# Patient Record
Sex: FEMALE | Race: WHITE | NOT HISPANIC OR LATINO
[De-identification: names, ages, dates, MRNs, and addresses within clinical notes are randomized per-mention and may not be internally consistent; named-entity substitution may affect disease eponyms.]

---

## 2017-11-17 ENCOUNTER — TRANSCRIPTION ENCOUNTER (OUTPATIENT)
Age: 42
End: 2017-11-17

## 2018-01-03 ENCOUNTER — TRANSCRIPTION ENCOUNTER (OUTPATIENT)
Age: 43
End: 2018-01-03

## 2019-01-24 ENCOUNTER — APPOINTMENT (OUTPATIENT)
Dept: FAMILY MEDICINE | Facility: CLINIC | Age: 44
End: 2019-01-24
Payer: COMMERCIAL

## 2019-01-24 VITALS — TEMPERATURE: 98.2 F | SYSTOLIC BLOOD PRESSURE: 90 MMHG | DIASTOLIC BLOOD PRESSURE: 60 MMHG

## 2019-01-24 PROCEDURE — 99214 OFFICE O/P EST MOD 30 MIN: CPT

## 2019-01-24 PROCEDURE — 87804 INFLUENZA ASSAY W/OPTIC: CPT | Mod: QW

## 2019-01-24 NOTE — PHYSICAL EXAM
[No Acute Distress] : no acute distress [Supple] : supple [Clear to Auscultation] : lungs were clear to auscultation bilaterally [Regular Rhythm] : with a regular rhythm [Normal Anterior Cervical Nodes] : no anterior cervical lymphadenopathy [Alert and Oriented x3] : oriented to person, place, and time [de-identified] : mildy injected throat with mucus

## 2019-01-24 NOTE — REVIEW OF SYSTEMS
[Fever] : fever [Chills] : chills [Fatigue] : fatigue [Nasal Discharge] : nasal discharge [Postnasal Drip] : postnasal drip [Negative] : Respiratory

## 2019-01-24 NOTE — ASSESSMENT
[FreeTextEntry1] : Pt presents to office for sick visit .Pt's daughter dx last week with flu.pt admits to 3 ay hx of low grade temp, body aches and sinus congestion. Clear nasal discharge but admits to HA and dental pain.\par \par Rapid Flu A +\par Pt to start Xofluza

## 2019-01-24 NOTE — HISTORY OF PRESENT ILLNESS
[FreeTextEntry8] : Pt presents to office for sick visit .Pt's daughter dx last week with flu.pt admits to 3 ay hx of low grade temp, body aches and sinus congestion. Clear nasal discharge but admits to HA and dental pain.

## 2019-02-01 ENCOUNTER — APPOINTMENT (OUTPATIENT)
Dept: SURGERY | Facility: CLINIC | Age: 44
End: 2019-02-01
Payer: COMMERCIAL

## 2019-02-01 VITALS
BODY MASS INDEX: 18.14 KG/M2 | HEIGHT: 59 IN | HEART RATE: 76 BPM | DIASTOLIC BLOOD PRESSURE: 66 MMHG | SYSTOLIC BLOOD PRESSURE: 104 MMHG | TEMPERATURE: 98.4 F | WEIGHT: 90 LBS | OXYGEN SATURATION: 98 % | RESPIRATION RATE: 15 BRPM

## 2019-02-01 DIAGNOSIS — Z87.09 PERSONAL HISTORY OF OTHER DISEASES OF THE RESPIRATORY SYSTEM: ICD-10-CM

## 2019-02-01 DIAGNOSIS — R68.89 OTHER GENERAL SYMPTOMS AND SIGNS: ICD-10-CM

## 2019-02-01 DIAGNOSIS — Z87.39 PERSONAL HISTORY OF OTHER DISEASES OF THE MUSCULOSKELETAL SYSTEM AND CONNECTIVE TISSUE: ICD-10-CM

## 2019-02-01 DIAGNOSIS — K42.9 UMBILICAL HERNIA W/OUT OBSTRUCTION OR GANGRENE: ICD-10-CM

## 2019-02-01 DIAGNOSIS — J10.1 INFLUENZA DUE TO OTHER IDENTIFIED INFLUENZA VIRUS WITH OTHER RESPIRATORY MANIFESTATIONS: ICD-10-CM

## 2019-02-01 DIAGNOSIS — Z22.322 CARRIER OR SUSPECTED CARRIER OF METHICILLIN RESISTANT STAPHYLOCOCCUS AUREUS: ICD-10-CM

## 2019-02-01 DIAGNOSIS — Z86.69 PERSONAL HISTORY OF OTHER DISEASES OF THE NERVOUS SYSTEM AND SENSE ORGANS: ICD-10-CM

## 2019-02-01 DIAGNOSIS — Z78.9 OTHER SPECIFIED HEALTH STATUS: ICD-10-CM

## 2019-02-01 PROCEDURE — 99204 OFFICE O/P NEW MOD 45 MIN: CPT

## 2019-02-01 RX ORDER — BALOXAVIR MARBOXIL 20 MG/1
2 X 20 TABLET, FILM COATED ORAL
Qty: 2 | Refills: 0 | Status: DISCONTINUED | COMMUNITY
Start: 2019-01-24 | End: 2019-02-01

## 2019-02-01 NOTE — ASSESSMENT
[FreeTextEntry1] : Quite thin 43-year-old female with mild diastasis recti, tiny umbilical hernia and umbilical protrusion due to thin surrounding fat layer.

## 2019-02-01 NOTE — HISTORY OF PRESENT ILLNESS
[de-identified] : Maira is a 42 y/o female here for a follow up visit for an umbilical hernia. She was last seen on 6/5/13.  [de-identified] : Approximately 6 years ago patient was first noted to have a small umbilical hernia and mild degree of midline diastasis. Hernia repair was advised but patient chose to defer. Since then she has noted a somewhat greater degree of protrusion of the umbilicus. No pain or tenderness and no other abdominal symptoms or change in bowel habits.

## 2019-02-01 NOTE — PLAN
[FreeTextEntry1] : Patient advised to seek consultation with plastic surgery regarding possible umbilical plasty to reduce the bulk of the umbilicus and decrease the degree of protrusion.

## 2019-02-01 NOTE — PHYSICAL EXAM
[Normal Thyroid] : the thyroid was normal [Respiratory Effort] : normal respiratory effort [Normal Rate and Rhythm] : normal rate and rhythm [Abdominal Aorta] : Normal abdominal aorta [No Rash or Lesion] : No rash or lesion [Oriented to Person] : oriented to person [Oriented to Place] : oriented to place [Oriented to Time] : oriented to time [Calm] : calm [de-identified] : In no distress [de-identified] : Normocephalic, atraumatic [de-identified] : No palpable adenopathy [de-identified] : Soft, nondistended, nontender. No palpable mass or organomegaly. Mild degree of midline diastasis centered at the umbilicus. Very thin layer of subcutaneous fat over the entire abdominal wall, especially over the area of diastasis. Moderate degree of protrusion of the umbilicus most of which represents umbilical skin lying directly on underlying fascia. Tiny fascial defect with protrusion of minimal amount of fat. Well-healed Pfannenstiel scar. [de-identified] : Normal gait; no deformities [de-identified] : No gross sensory or motor deficit [de-identified] : Normal mood and affect

## 2019-02-04 LAB
MRSA SPEC QL CULT: NOT DETECTED
STAPH AUREUS (SA): NOT DETECTED

## 2019-02-11 LAB
FLUAV SPEC QL CULT: POSITIVE
FLUBV AG SPEC QL IA: NEGATIVE

## 2019-04-08 ENCOUNTER — APPOINTMENT (OUTPATIENT)
Dept: SURGERY | Facility: CLINIC | Age: 44
End: 2019-04-08
Payer: COMMERCIAL

## 2019-04-08 VITALS
HEART RATE: 58 BPM | HEIGHT: 59 IN | BODY MASS INDEX: 18.35 KG/M2 | WEIGHT: 91 LBS | SYSTOLIC BLOOD PRESSURE: 100 MMHG | DIASTOLIC BLOOD PRESSURE: 65 MMHG | TEMPERATURE: 98 F

## 2019-04-08 PROCEDURE — 99243 OFF/OP CNSLTJ NEW/EST LOW 30: CPT

## 2019-06-20 ENCOUNTER — OUTPATIENT (OUTPATIENT)
Dept: OUTPATIENT SERVICES | Facility: HOSPITAL | Age: 44
LOS: 1 days | End: 2019-06-20

## 2019-06-20 VITALS
WEIGHT: 85.98 LBS | DIASTOLIC BLOOD PRESSURE: 60 MMHG | HEART RATE: 60 BPM | RESPIRATION RATE: 16 BRPM | HEIGHT: 57 IN | TEMPERATURE: 98 F | SYSTOLIC BLOOD PRESSURE: 98 MMHG

## 2019-06-20 DIAGNOSIS — Z41.1 ENCOUNTER FOR COSMETIC SURGERY: ICD-10-CM

## 2019-06-20 RX ORDER — SODIUM CHLORIDE 9 MG/ML
3 INJECTION INTRAMUSCULAR; INTRAVENOUS; SUBCUTANEOUS EVERY 8 HOURS
Refills: 0 | Status: DISCONTINUED | OUTPATIENT
Start: 2019-07-02 | End: 2019-07-03

## 2019-06-20 NOTE — H&P PST ADULT - NEGATIVE GASTROINTESTINAL SYMPTOMS
no change in bowel habits/no melena/no jaundice/no nausea/no vomiting/no constipation/no abdominal pain/no steatorrhea/no hiccoughs/no diarrhea

## 2019-06-20 NOTE — H&P PST ADULT - NEGATIVE CARDIOVASCULAR SYMPTOMS
no dyspnea on exertion/no chest pain/no claudication/no paroxysmal nocturnal dyspnea/no palpitations/no orthopnea/no peripheral edema

## 2019-06-20 NOTE — H&P PST ADULT - NEGATIVE ENMT SYMPTOMS
no dysphagia/no hearing difficulty/no tinnitus/no nasal discharge/no throat pain/no vertigo/no sinus symptoms/no nasal obstruction/no post-nasal discharge/no nose bleeds/no recurrent cold sores/no abnormal taste sensation/no ear pain

## 2019-06-20 NOTE — H&P PST ADULT - NEGATIVE BREAST SYMPTOMS
no breast lump L/no nipple discharge L/no nipple discharge R/no breast tenderness L/no breast tenderness R/no breast lump R

## 2019-06-20 NOTE — H&P PST ADULT - RS GEN PE MLT RESP DETAILS PC
airway patent/good air movement/breath sounds equal/clear to auscultation bilaterally/no rhonchi/no chest wall tenderness/no rales/no wheezes/respirations non-labored/no intercostal retractions

## 2019-06-20 NOTE — H&P PST ADULT - NSICDXPROBLEM_GEN_ALL_CORE_FT
PROBLEM DIAGNOSES  Problem: Encounter for cosmetic surgery  Assessment and Plan: Scheduled for abdominoplasty on 07/02/19. Pre op instructions, famotidine, chlorhexidine gluconate soap given and explained. Pt verbalized understanding. Written instructions given to pt with teach back demonstration  Pt instructed to bring urine specimen on day of surgery, container given to pt who verbalized understanding.

## 2019-06-20 NOTE — H&P PST ADULT - NEGATIVE SKIN SYMPTOMS
no dryness/no itching/no rash/no change in size/color of mole/no brittle nails/no hair loss/no tumor

## 2019-06-20 NOTE — H&P PST ADULT - HISTORY OF PRESENT ILLNESS
43 y/o  female   h/o abd. hernia since 2011 43 y/o  female presents to [facility] for pre op evaluation with h/o abd. hernia s/p  section in . Now scheduled for abdominoplasty on 19

## 2019-06-20 NOTE — H&P PST ADULT - NSANTHOSAYNRD_GEN_A_CORE
No. ERA screening performed.  STOP BANG Legend: 0-2 = LOW Risk; 3-4 = INTERMEDIATE Risk; 5-8 = HIGH Risk

## 2019-07-01 ENCOUNTER — TRANSCRIPTION ENCOUNTER (OUTPATIENT)
Age: 44
End: 2019-07-01

## 2019-07-02 ENCOUNTER — TRANSCRIPTION ENCOUNTER (OUTPATIENT)
Age: 44
End: 2019-07-02

## 2019-07-02 ENCOUNTER — INPATIENT (INPATIENT)
Facility: HOSPITAL | Age: 44
LOS: 0 days | Discharge: HOME | End: 2019-07-03
Attending: SURGERY | Admitting: SURGERY
Payer: COMMERCIAL

## 2019-07-02 ENCOUNTER — APPOINTMENT (OUTPATIENT)
Dept: SURGERY | Facility: HOSPITAL | Age: 44
End: 2019-07-02

## 2019-07-02 VITALS
SYSTOLIC BLOOD PRESSURE: 102 MMHG | DIASTOLIC BLOOD PRESSURE: 53 MMHG | HEART RATE: 63 BPM | RESPIRATION RATE: 15 BRPM | HEIGHT: 57 IN | WEIGHT: 85.98 LBS | TEMPERATURE: 98 F | OXYGEN SATURATION: 99 %

## 2019-07-02 DIAGNOSIS — K43.9 VENTRAL HERNIA WITHOUT OBSTRUCTION OR GANGRENE: ICD-10-CM

## 2019-07-02 DIAGNOSIS — Z41.1 ENCOUNTER FOR COSMETIC SURGERY: ICD-10-CM

## 2019-07-02 LAB — HCG UR QL: NEGATIVE — SIGNIFICANT CHANGE UP

## 2019-07-02 PROCEDURE — 15830 EXC EXCESSIVE SKIN ABDOMEN: CPT

## 2019-07-02 PROCEDURE — 49560: CPT

## 2019-07-02 RX ORDER — SENNA PLUS 8.6 MG/1
2 TABLET ORAL AT BEDTIME
Refills: 0 | Status: DISCONTINUED | OUTPATIENT
Start: 2019-07-02 | End: 2019-07-03

## 2019-07-02 RX ORDER — HYDROMORPHONE HYDROCHLORIDE 2 MG/ML
0.5 INJECTION INTRAMUSCULAR; INTRAVENOUS; SUBCUTANEOUS
Refills: 0 | Status: DISCONTINUED | OUTPATIENT
Start: 2019-07-02 | End: 2019-07-03

## 2019-07-02 RX ORDER — BENZOCAINE AND MENTHOL 5; 1 G/100ML; G/100ML
1 LIQUID ORAL
Refills: 0 | Status: DISCONTINUED | OUTPATIENT
Start: 2019-07-02 | End: 2019-07-03

## 2019-07-02 RX ORDER — DIAZEPAM 5 MG
5 TABLET ORAL EVERY 6 HOURS
Refills: 0 | Status: DISCONTINUED | OUTPATIENT
Start: 2019-07-02 | End: 2019-07-03

## 2019-07-02 RX ORDER — OXYCODONE HYDROCHLORIDE 5 MG/1
10 TABLET ORAL EVERY 4 HOURS
Refills: 0 | Status: DISCONTINUED | OUTPATIENT
Start: 2019-07-02 | End: 2019-07-03

## 2019-07-02 RX ORDER — ACETAMINOPHEN 500 MG
650 TABLET ORAL EVERY 6 HOURS
Refills: 0 | Status: DISCONTINUED | OUTPATIENT
Start: 2019-07-02 | End: 2019-07-03

## 2019-07-02 RX ORDER — KETOTIFEN FUMARATE 0.34 MG/ML
1 SOLUTION OPHTHALMIC DAILY
Refills: 0 | Status: DISCONTINUED | OUTPATIENT
Start: 2019-07-02 | End: 2019-07-03

## 2019-07-02 RX ORDER — CEFAZOLIN SODIUM 1 G
1000 VIAL (EA) INJECTION EVERY 8 HOURS
Refills: 0 | Status: DISCONTINUED | OUTPATIENT
Start: 2019-07-02 | End: 2019-07-03

## 2019-07-02 RX ORDER — METOCLOPRAMIDE HCL 10 MG
10 TABLET ORAL EVERY 6 HOURS
Refills: 0 | Status: DISCONTINUED | OUTPATIENT
Start: 2019-07-02 | End: 2019-07-03

## 2019-07-02 RX ORDER — SODIUM CHLORIDE 9 MG/ML
1000 INJECTION, SOLUTION INTRAVENOUS
Refills: 0 | Status: DISCONTINUED | OUTPATIENT
Start: 2019-07-02 | End: 2019-07-02

## 2019-07-02 RX ORDER — SODIUM CHLORIDE 9 MG/ML
1000 INJECTION, SOLUTION INTRAVENOUS
Refills: 0 | Status: DISCONTINUED | OUTPATIENT
Start: 2019-07-02 | End: 2019-07-03

## 2019-07-02 RX ORDER — DOCUSATE SODIUM 100 MG
100 CAPSULE ORAL DAILY
Refills: 0 | Status: DISCONTINUED | OUTPATIENT
Start: 2019-07-02 | End: 2019-07-03

## 2019-07-02 RX ORDER — OXYCODONE HYDROCHLORIDE 5 MG/1
5 TABLET ORAL EVERY 4 HOURS
Refills: 0 | Status: DISCONTINUED | OUTPATIENT
Start: 2019-07-02 | End: 2019-07-03

## 2019-07-02 RX ORDER — ONDANSETRON 8 MG/1
4 TABLET, FILM COATED ORAL EVERY 6 HOURS
Refills: 0 | Status: DISCONTINUED | OUTPATIENT
Start: 2019-07-02 | End: 2019-07-03

## 2019-07-02 RX ORDER — DIPHENHYDRAMINE HCL 50 MG
25 CAPSULE ORAL EVERY 6 HOURS
Refills: 0 | Status: DISCONTINUED | OUTPATIENT
Start: 2019-07-02 | End: 2019-07-03

## 2019-07-02 RX ADMIN — Medication 1 DROP(S): at 20:55

## 2019-07-02 RX ADMIN — Medication 100 MILLIGRAM(S): at 14:41

## 2019-07-02 RX ADMIN — SENNA PLUS 2 TABLET(S): 8.6 TABLET ORAL at 21:27

## 2019-07-02 RX ADMIN — SODIUM CHLORIDE 3 MILLILITER(S): 9 INJECTION INTRAMUSCULAR; INTRAVENOUS; SUBCUTANEOUS at 21:28

## 2019-07-02 RX ADMIN — Medication 100 MILLIGRAM(S): at 15:27

## 2019-07-02 RX ADMIN — HYDROMORPHONE HYDROCHLORIDE 0.5 MILLIGRAM(S): 2 INJECTION INTRAMUSCULAR; INTRAVENOUS; SUBCUTANEOUS at 12:13

## 2019-07-02 RX ADMIN — SODIUM CHLORIDE 3 MILLILITER(S): 9 INJECTION INTRAMUSCULAR; INTRAVENOUS; SUBCUTANEOUS at 18:01

## 2019-07-02 RX ADMIN — ONDANSETRON 4 MILLIGRAM(S): 8 TABLET, FILM COATED ORAL at 16:25

## 2019-07-02 RX ADMIN — Medication 650 MILLIGRAM(S): at 20:57

## 2019-07-02 RX ADMIN — HYDROMORPHONE HYDROCHLORIDE 0.5 MILLIGRAM(S): 2 INJECTION INTRAMUSCULAR; INTRAVENOUS; SUBCUTANEOUS at 12:30

## 2019-07-02 RX ADMIN — SODIUM CHLORIDE 50 MILLILITER(S): 9 INJECTION, SOLUTION INTRAVENOUS at 12:14

## 2019-07-02 RX ADMIN — Medication 5 MILLIGRAM(S): at 20:55

## 2019-07-02 RX ADMIN — Medication 650 MILLIGRAM(S): at 14:40

## 2019-07-02 RX ADMIN — Medication 650 MILLIGRAM(S): at 21:27

## 2019-07-02 NOTE — DISCHARGE NOTE PROVIDER - NSDCCPCAREPLAN_GEN_ALL_CORE_FT
PRINCIPAL DISCHARGE DIAGNOSIS  Diagnosis: Ventral hernia  Assessment and Plan of Treatment: Ventral hernia repair w/ abdominoplasty

## 2019-07-02 NOTE — DISCHARGE NOTE PROVIDER - CARE PROVIDER_API CALL
Kimani Shi)  Plastic Surgery  [address] [place] [address], [address]  [place] [address]  Phone: [phone]  Fax: [phone]  Follow Up Time:

## 2019-07-02 NOTE — BRIEF OPERATIVE NOTE - NSICDXBRIEFPREOP_GEN_ALL_CORE_FT
PRE-OP DIAGNOSIS:  Breast ptosis 02-Jul-2019 21:06:26  Yves Mckeon  Diastasis recti 02-Jul-2019 21:06:20  Yves Mckeon  Laxity of abdominal wall 02-Jul-2019 21:06:08  Yves Mckeon  Lipodystrophy 02-Jul-2019 21:06:02  Yves Mckeon

## 2019-07-02 NOTE — ASU PATIENT PROFILE, ADULT - AS SC BRADEN NUTRITION
Pt placed in shoulder immobilizer, cms to arm adequate.  Pt is alert, oriented upon leaving.  Pt and wife verbalized understanding of all discharge instructions. Pt given paper script for norco.     (3) adequate

## 2019-07-02 NOTE — DISCHARGE NOTE PROVIDER - HOSPITAL COURSE
Pt presented to hospital for and abdominoplasty with ventral hernia repair. She tolerated the procedure well and was transferred to the PACU in stable condition.  She spent one night in the hospital and at the time of discharge her pain was well managed with PO pain medications, she was walking, eating. Pt presented to hospital for abdominoplasty with ventral hernia repair. She tolerated the procedure well and was transferred to the PACU in stable condition.  She spent one night in the hospital where her vitals remained stable.  At the time of discharge her pain was well managed with PO pain medications, she was walking, and eating.

## 2019-07-02 NOTE — BRIEF OPERATIVE NOTE - NSICDXBRIEFPOSTOP_GEN_ALL_CORE_FT
POST-OP DIAGNOSIS:  Diastasis recti 02-Jul-2019 21:07:37  Yves Mckeon  Breast ptosis 02-Jul-2019 21:07:22  Yves Mckeon  Lipodystrophy 02-Jul-2019 21:06:53  Yves Mckeon  Laxity of abdominal wall 02-Jul-2019 21:06:44  Yves Mckeon

## 2019-07-02 NOTE — BRIEF OPERATIVE NOTE - NSICDXBRIEFPROCEDURE_GEN_ALL_CORE_FT
PROCEDURES:  Abdominoplasty 02-Jul-2019 21:04:36  Yves Mckeon  Mastopexy, bilateral 02-Jul-2019 21:04:27  Yves Mckeon

## 2019-07-03 ENCOUNTER — TRANSCRIPTION ENCOUNTER (OUTPATIENT)
Age: 44
End: 2019-07-03

## 2019-07-03 VITALS
TEMPERATURE: 97 F | OXYGEN SATURATION: 100 % | SYSTOLIC BLOOD PRESSURE: 82 MMHG | HEART RATE: 75 BPM | RESPIRATION RATE: 17 BRPM | DIASTOLIC BLOOD PRESSURE: 49 MMHG

## 2019-07-03 RX ORDER — DIAZEPAM 5 MG
0 TABLET ORAL
Qty: 0 | Refills: 0 | DISCHARGE

## 2019-07-03 RX ORDER — OXYCODONE HYDROCHLORIDE 5 MG/1
1 TABLET ORAL
Qty: 0 | Refills: 0 | DISCHARGE

## 2019-07-03 RX ORDER — OLOPATADINE HYDROCHLORIDE 1 MG/ML
1 SOLUTION/ DROPS OPHTHALMIC
Qty: 0 | Refills: 0 | DISCHARGE

## 2019-07-03 RX ORDER — OXYCODONE HYDROCHLORIDE 5 MG/1
5 TABLET ORAL ONCE
Refills: 0 | Status: DISCONTINUED | OUTPATIENT
Start: 2019-07-03 | End: 2019-07-03

## 2019-07-03 RX ORDER — CEPHALEXIN 500 MG
1 CAPSULE ORAL
Qty: 0 | Refills: 0 | DISCHARGE

## 2019-07-03 RX ORDER — IBUPROFEN 200 MG
1 TABLET ORAL
Qty: 0 | Refills: 0 | DISCHARGE

## 2019-07-03 RX ORDER — SENNA PLUS 8.6 MG/1
2 TABLET ORAL
Qty: 0 | Refills: 0 | DISCHARGE
Start: 2019-07-03

## 2019-07-03 RX ORDER — DIAZEPAM 5 MG
1 TABLET ORAL
Qty: 0 | Refills: 0 | DISCHARGE

## 2019-07-03 RX ADMIN — Medication 5 MILLIGRAM(S): at 07:58

## 2019-07-03 RX ADMIN — Medication 650 MILLIGRAM(S): at 18:50

## 2019-07-03 RX ADMIN — OXYCODONE HYDROCHLORIDE 5 MILLIGRAM(S): 5 TABLET ORAL at 15:56

## 2019-07-03 RX ADMIN — OXYCODONE HYDROCHLORIDE 5 MILLIGRAM(S): 5 TABLET ORAL at 11:05

## 2019-07-03 RX ADMIN — OXYCODONE HYDROCHLORIDE 5 MILLIGRAM(S): 5 TABLET ORAL at 16:26

## 2019-07-03 RX ADMIN — OXYCODONE HYDROCHLORIDE 5 MILLIGRAM(S): 5 TABLET ORAL at 10:35

## 2019-07-03 RX ADMIN — OXYCODONE HYDROCHLORIDE 5 MILLIGRAM(S): 5 TABLET ORAL at 19:54

## 2019-07-03 RX ADMIN — Medication 650 MILLIGRAM(S): at 10:40

## 2019-07-03 RX ADMIN — Medication 5 MILLIGRAM(S): at 13:52

## 2019-07-03 RX ADMIN — Medication 100 MILLIGRAM(S): at 00:58

## 2019-07-03 RX ADMIN — Medication 650 MILLIGRAM(S): at 18:16

## 2019-07-03 RX ADMIN — Medication 100 MILLIGRAM(S): at 12:08

## 2019-07-03 RX ADMIN — SODIUM CHLORIDE 3 MILLILITER(S): 9 INJECTION INTRAMUSCULAR; INTRAVENOUS; SUBCUTANEOUS at 05:18

## 2019-07-03 RX ADMIN — Medication 650 MILLIGRAM(S): at 10:10

## 2019-07-03 RX ADMIN — SODIUM CHLORIDE 3 MILLILITER(S): 9 INJECTION INTRAMUSCULAR; INTRAVENOUS; SUBCUTANEOUS at 13:49

## 2019-07-03 RX ADMIN — SODIUM CHLORIDE 75 MILLILITER(S): 9 INJECTION, SOLUTION INTRAVENOUS at 08:21

## 2019-07-03 RX ADMIN — Medication 100 MILLIGRAM(S): at 15:57

## 2019-07-03 RX ADMIN — OXYCODONE HYDROCHLORIDE 5 MILLIGRAM(S): 5 TABLET ORAL at 00:37

## 2019-07-03 RX ADMIN — OXYCODONE HYDROCHLORIDE 5 MILLIGRAM(S): 5 TABLET ORAL at 00:07

## 2019-07-03 RX ADMIN — Medication 100 MILLIGRAM(S): at 08:20

## 2019-07-03 NOTE — DISCHARGE NOTE NURSING/CASE MANAGEMENT/SOCIAL WORK - NSDCDPATPORTLINK_GEN_ALL_CORE
You can access the [facility] Portal, offered by [facility], by registering with the following website: [email or web address]

## 2019-07-03 NOTE — PROGRESS NOTE ADULT - ASSESSMENT
44yoF s/p ventral hernia repair w/ abdominoplasty on 7/2  - Keep JPs in place to bulb suction  -OOB encourage ambulation  -d/c bell  -Advance diet as tolerated.  -May discharge later after seen by Dr. Shi. 44yoF s/p ventral hernia repair w/ abdominoplasty on 7/2  - Keep JPs in place to bulb suction  -Keep HOB elevated to maintain hunched over position for abdomen.  Please do not lie flat.  -OOB encourage ambulation  -d/c bell  -Advance diet as tolerated.  -May discharge later after seen by Dr. Shi.

## 2019-07-03 NOTE — PROGRESS NOTE ADULT - SUBJECTIVE AND OBJECTIVE BOX
Plastic Surgery    SUBJECTIVE: Pt seen and examined on rounds with teamDenis OWUSU. Some soft pressures (80s/50s) throughout night.  Doing well     VITALS  T(C): 36.7 (07-03-19 @ 05:16), Max: 36.9 (07-02-19 @ 11:20)  HR: 50 (07-03-19 @ 05:16) (50 - 89)  BP: 83/48 (07-03-19 @ 05:16) (81/44 - 99/53)  RR: 16 (07-03-19 @ 05:16) (12 - 24)  SpO2: 99% (07-03-19 @ 05:16) (93% - 100%)  CAPILLARY BLOOD GLUCOSE          Is/Os    07-02 @ 07:01  -  07-03 @ 07:00  --------------------------------------------------------  IN:    lactated ringers.: 100 mL    lactated ringers.: 600 mL    Oral Fluid: 100 mL  Total IN: 800 mL    OUT:    Bulb: 27.5 mL    Bulb: 25 mL    Indwelling Catheter - Urethral: 1960 mL  Total OUT: 2012.5 mL    Total NET: -1212.5 mL          PHYSICAL EXAM:   General: NAD, Lying in bed comfortably  Neuro: Awake and alert  GI/Abd: Soft, NT/ND, incisions c/d/i. No signs of fluid collection.  JPs in place with SS output bilaterally.       MEDICATIONS (STANDING): acetaminophen   Tablet .. 650 milliGRAM(s) Oral every 6 hours  ceFAZolin   IVPB 1000 milliGRAM(s) IV Intermittent every 8 hours  docusate sodium 100 milliGRAM(s) Oral daily  lactated ringers. 1000 milliLiter(s) IV Continuous <Continuous>  senna 2 Tablet(s) Oral at bedtime  sodium chloride 0.9% lock flush 3 milliLiter(s) IV Push every 8 hours    MEDICATIONS (PRN):aluminum hydroxide/magnesium hydroxide/simethicone Suspension 30 milliLiter(s) Oral every 4 hours PRN Dyspepsia  diazepam    Tablet 5 milliGRAM(s) Oral every 6 hours PRN Muscle spasm  diphenhydrAMINE   Injectable 25 milliGRAM(s) IV Push every 6 hours PRN Rash and/or Itching  HYDROmorphone  Injectable 0.5 milliGRAM(s) IV Push every 10 minutes PRN Moderate Pain (4 - 6)  metoclopramide Injectable 10 milliGRAM(s) IV Push every 6 hours PRN nausea despite zofran  ondansetron Injectable 4 milliGRAM(s) IV Push every 6 hours PRN Nausea and/or Vomiting  oxyCODONE    IR 5 milliGRAM(s) Oral every 4 hours PRN Moderate Pain (4 - 6)  oxyCODONE    IR 10 milliGRAM(s) Oral every 4 hours PRN Severe Pain (7 - 10)

## 2019-09-19 ENCOUNTER — APPOINTMENT (OUTPATIENT)
Dept: FAMILY MEDICINE | Facility: CLINIC | Age: 44
End: 2019-09-19
Payer: COMMERCIAL

## 2019-09-19 ENCOUNTER — APPOINTMENT (OUTPATIENT)
Dept: RADIOLOGY | Facility: CLINIC | Age: 44
End: 2019-09-19
Payer: COMMERCIAL

## 2019-09-19 ENCOUNTER — OUTPATIENT (OUTPATIENT)
Dept: OUTPATIENT SERVICES | Facility: HOSPITAL | Age: 44
LOS: 1 days | End: 2019-09-19
Payer: COMMERCIAL

## 2019-09-19 VITALS — TEMPERATURE: 99.1 F

## 2019-09-19 DIAGNOSIS — R50.9 FEVER, UNSPECIFIED: ICD-10-CM

## 2019-09-19 DIAGNOSIS — R05 COUGH: ICD-10-CM

## 2019-09-19 PROCEDURE — 71046 X-RAY EXAM CHEST 2 VIEWS: CPT

## 2019-09-19 PROCEDURE — 99214 OFFICE O/P EST MOD 30 MIN: CPT | Mod: 25

## 2019-09-19 PROCEDURE — 87804 INFLUENZA ASSAY W/OPTIC: CPT | Mod: QW

## 2019-09-19 PROCEDURE — 71046 X-RAY EXAM CHEST 2 VIEWS: CPT | Mod: 26

## 2019-09-19 RX ORDER — AZITHROMYCIN 250 MG/1
250 TABLET, FILM COATED ORAL
Qty: 1 | Refills: 0 | Status: ACTIVE | COMMUNITY
Start: 2019-09-19 | End: 1900-01-01

## 2019-09-19 NOTE — ADDENDUM
[FreeTextEntry1] : I, Caty Thomas, acted solely as a scribe for Dr. Bean on this date 09/19/2019.\par \par All medical record entries made by the Scribe were at my, Dr. Bean, direction and personally dictated by me on 09/19/2019. I have reviewed the chart and agree that the record accurately reflects my personal performance of the history, physical exam, assessment and plan.  I have also personally directed, reviewed and agreed with the chart.

## 2019-09-19 NOTE — HISTORY OF PRESENT ILLNESS
[FreeTextEntry8] : 45y/o F with no significant PMHx presents to the office with c/o congestion, painful swallowing, and stomach pain that started 1 wk. Sx then developed into HA, non-productive cough, and fever 2 days ago T 101 Pt also reports muscle aches throughout entire body. Pt notes sick contact of son with pneumonia 2 wks ago. Denies wheezing or SOB.

## 2019-09-19 NOTE — PHYSICAL EXAM
[Normal] : no JVD, supple, thyroid normal/no nodules, no lymphadenopathy [de-identified] : throat mildy injected, PND, mucus in throat [de-identified] : cervical nodes normal [de-identified] : . [de-identified] : AA&Ox3

## 2019-09-19 NOTE — REVIEW OF SYSTEMS
[Fever] : fever [Postnasal Drip] : postnasal drip [Muscle Pain] : muscle pain [Headache] : headache [Negative] : Integumentary [Chills] : chills [Fatigue] : fatigue [Cough] : cough [Shortness Of Breath] : no shortness of breath [Wheezing] : no wheezing [FreeTextEntry4] :  , congestion, painful swallowing [FreeTextEntry6] : dry cough painful to cough

## 2019-09-19 NOTE — PLAN
[FreeTextEntry1] : URI\par - Rapid flu test in office, results negative\par - Order Chest X-ray for possible PNA\par - Start Zithromax\par - Hydration\par - Advil or Tylenol\par \par Cough\par - Start Delsym\par

## 2019-09-20 LAB
FLUAV SPEC QL CULT: NEGATIVE
FLUBV AG SPEC QL IA: NEGATIVE

## 2024-06-26 ENCOUNTER — NON-APPOINTMENT (OUTPATIENT)
Age: 49
End: 2024-06-26

## 2024-06-26 PROBLEM — R59.9 ADENOPATHY: Status: ACTIVE | Noted: 2024-06-26

## 2024-06-27 ENCOUNTER — APPOINTMENT (OUTPATIENT)
Dept: SURGICAL ONCOLOGY | Facility: CLINIC | Age: 49
End: 2024-06-27
Payer: COMMERCIAL

## 2024-06-27 ENCOUNTER — NON-APPOINTMENT (OUTPATIENT)
Age: 49
End: 2024-06-27

## 2024-06-27 VITALS
OXYGEN SATURATION: 97 % | WEIGHT: 90 LBS | BODY MASS INDEX: 18.14 KG/M2 | RESPIRATION RATE: 17 BRPM | HEART RATE: 68 BPM | HEIGHT: 59 IN | DIASTOLIC BLOOD PRESSURE: 67 MMHG | SYSTOLIC BLOOD PRESSURE: 112 MMHG

## 2024-06-27 DIAGNOSIS — R22.32 LOCALIZED SWELLING, MASS AND LUMP, LEFT UPPER LIMB: ICD-10-CM

## 2024-06-27 DIAGNOSIS — R59.9 ENLARGED LYMPH NODES, UNSPECIFIED: ICD-10-CM

## 2024-06-27 PROCEDURE — 99205 OFFICE O/P NEW HI 60 MIN: CPT

## 2024-07-25 ENCOUNTER — APPOINTMENT (OUTPATIENT)
Dept: PLASTIC SURGERY | Facility: CLINIC | Age: 49
End: 2024-07-25

## 2024-07-25 DIAGNOSIS — R22.32 LOCALIZED SWELLING, MASS AND LUMP, LEFT UPPER LIMB: ICD-10-CM

## 2024-07-25 PROCEDURE — 99443: CPT | Mod: 93

## 2024-07-26 NOTE — DISCUSSION/SUMMARY
[TextEntry] : KB ROGERS is a 49 year old F who presents for initial consultation for reconstructive options after planned excision of left axillary mass. This was a telephonic visit.  Extensive discussion with patient regarding reconstructive options. I explained that following excision there will be a full thickness defect of the involved area.  The reconstructive options will be based on the defect size and surrounding tissue laxity of the involved area.  Primary closure is only possible for smaller defects.  For larger defects, local tissue rearrangement or skin grafting may be necessary.  The patient was explained the risks of each option. Risks following layered primary closure or local tissue rearrangement include wound dehiscence, contour irregularity, bleeding, infection, and paraesthesias.  Risks following skin grafting include wound dehiscence, skin graft nonadherence (partial or complete), contour irregularity, bleeding, infection, paraesthesias, and donor site complications.  All questions were answered; the patient expressed reluctance to proceed and would like to take some time to think about it before proceeding with surgery.   - Patient would like to proceed with surgery at a later time - Patient will contact the office when she is ready to proceed - F/u PRN

## 2024-07-26 NOTE — HISTORY OF PRESENT ILLNESS
[FreeTextEntry1] : KB ROGERS is a 49 year old F who presents for initial consultation for reconstructive options after planned excision of left axillary mass.  This was a telephonic visit.   Patient reports known left axillary mass since her 20's, however has gradually increased in size, endorses intermittent discomfort, and became more pronounced when she was pregnant. Denies bleeding or drainage from the area. At time of visit, she denies cp, sob, subjective fever, chills, headache, cough, myalgia, malaise, abd pain, n/v/d, decreased appetite, and generalized weakness. Denies previous breast biopsies.  Most recent mammogram: B/l mammo and US 23 BIRADS 2. PMHx: Mirena IUD, Denies personal hx of breast CA PSHx: s/p abdominoplasty (Dr. Kimani Shi) with concomitant repair of periumbilical hernia (Dr. Abraham Mayo),  section Family hx: Denies hx of malignancy Allergies: Nizoral, seasonal allergies Current meds: antifungal foot cream Social hx: , nonsmoker, social ETOH use

## 2024-08-27 ENCOUNTER — APPOINTMENT (OUTPATIENT)
Dept: SURGICAL ONCOLOGY | Facility: HOSPITAL | Age: 49
End: 2024-08-27

## 2024-10-10 ENCOUNTER — APPOINTMENT (OUTPATIENT)
Dept: PLASTIC SURGERY | Facility: CLINIC | Age: 49
End: 2024-10-10
Payer: COMMERCIAL

## 2024-10-10 ENCOUNTER — TRANSCRIPTION ENCOUNTER (OUTPATIENT)
Age: 49
End: 2024-10-10

## 2024-10-10 VITALS
HEART RATE: 57 BPM | SYSTOLIC BLOOD PRESSURE: 105 MMHG | WEIGHT: 90 LBS | BODY MASS INDEX: 18.14 KG/M2 | RESPIRATION RATE: 16 BRPM | TEMPERATURE: 98.2 F | DIASTOLIC BLOOD PRESSURE: 67 MMHG | OXYGEN SATURATION: 99 % | HEIGHT: 59 IN

## 2024-10-10 DIAGNOSIS — R22.32 LOCALIZED SWELLING, MASS AND LUMP, LEFT UPPER LIMB: ICD-10-CM

## 2024-10-10 DIAGNOSIS — R59.9 ENLARGED LYMPH NODES, UNSPECIFIED: ICD-10-CM

## 2024-10-10 PROCEDURE — 99203 OFFICE O/P NEW LOW 30 MIN: CPT

## 2024-11-19 ENCOUNTER — APPOINTMENT (OUTPATIENT)
Dept: SURGICAL ONCOLOGY | Facility: AMBULATORY SURGERY CENTER | Age: 49
End: 2024-11-19

## 2024-11-19 ENCOUNTER — OUTPATIENT (OUTPATIENT)
Dept: OUTPATIENT SERVICES | Facility: HOSPITAL | Age: 49
LOS: 1 days | End: 2024-11-19
Payer: COMMERCIAL

## 2024-11-19 VITALS
HEART RATE: 62 BPM | WEIGHT: 91.71 LBS | SYSTOLIC BLOOD PRESSURE: 106 MMHG | TEMPERATURE: 107 F | RESPIRATION RATE: 16 BRPM | DIASTOLIC BLOOD PRESSURE: 57 MMHG | HEIGHT: 57.25 IN | OXYGEN SATURATION: 100 %

## 2024-11-19 DIAGNOSIS — Z01.818 ENCOUNTER FOR OTHER PREPROCEDURAL EXAMINATION: ICD-10-CM

## 2024-11-19 DIAGNOSIS — R22.32 LOCALIZED SWELLING, MASS AND LUMP, LEFT UPPER LIMB: ICD-10-CM

## 2024-11-19 DIAGNOSIS — Z98.890 OTHER SPECIFIED POSTPROCEDURAL STATES: Chronic | ICD-10-CM

## 2024-11-19 LAB
HCT VFR BLD CALC: 43.3 % — SIGNIFICANT CHANGE UP (ref 34.5–45)
HGB BLD-MCNC: 14.1 G/DL — SIGNIFICANT CHANGE UP (ref 11.5–15.5)
MCHC RBC-ENTMCNC: 31.4 PG — SIGNIFICANT CHANGE UP (ref 27–34)
MCHC RBC-ENTMCNC: 32.6 G/DL — SIGNIFICANT CHANGE UP (ref 32–36)
MCV RBC AUTO: 96.4 FL — SIGNIFICANT CHANGE UP (ref 80–100)
NRBC # BLD: 0 /100 WBCS — SIGNIFICANT CHANGE UP (ref 0–0)
PLATELET # BLD AUTO: 192 K/UL — SIGNIFICANT CHANGE UP (ref 150–400)
RBC # BLD: 4.49 M/UL — SIGNIFICANT CHANGE UP (ref 3.8–5.2)
RBC # FLD: 12.6 % — SIGNIFICANT CHANGE UP (ref 10.3–14.5)
WBC # BLD: 5.42 K/UL — SIGNIFICANT CHANGE UP (ref 3.8–10.5)
WBC # FLD AUTO: 5.42 K/UL — SIGNIFICANT CHANGE UP (ref 3.8–10.5)

## 2024-11-19 PROCEDURE — 36415 COLL VENOUS BLD VENIPUNCTURE: CPT

## 2024-11-19 PROCEDURE — 85027 COMPLETE CBC AUTOMATED: CPT

## 2024-11-19 PROCEDURE — G0463: CPT

## 2024-11-19 NOTE — H&P PST ADULT - RESPIRATORY AND THORAX
April 15, 2019      Quentin Bedolla  [address] [place] [address]        Dear ,    We are writing to inform you of your test results.    Your shoulder xray was normal, I suggest you continue to work on stretching and follow-up with ortho as we discussed.    Resulted Orders   XR Shoulder Left G/E 3 Views    Narrative    SHOULDER LEFT THREE OR MORE VIEWS   4/12/2019 4:13 PM     HISTORY: Persistent shoulder pain. Impingement syndrome, shoulder,  left.    COMPARISON: None.     FINDINGS: There is no fracture. The humeral head is well located  within the glenoid fossa.  Glenohumeral, acromioclavicular, and  coracoclavicular spaces are well maintained. Visualized portions of  the adjacent lung are clear.      Impression    IMPRESSION: Negative left shoulder x-rays. If there is significant  clinical concern for rotator cuff or other soft tissue pathology of  the shoulder, further evaluation with MRI may be helpful.    ROSELYN VALLE MD       If you have any questions or concerns, please call the clinic at the number listed above.       Sincerely,        Amna Mendez PA-C/am   negative

## 2024-11-19 NOTE — H&P PST ADULT - HISTORY OF PRESENT ILLNESS
This is a 49 w/o female who presents to PST with pre-operative diagnosis of left axillary mass.  Pt reports years of excess axillary tissue that flares cyclically. Denies any pain, palpable mass in b/l axillary or breast tissues, or nipple discharge.  Otherwise pt feels well today and denies any acute symptoms.

## 2024-11-19 NOTE — H&P PST ADULT - NSANTHSNORERD_ENT_A_CORE
----- Message from Coni Law sent at 3/29/2017  3:03 PM CDT -----  Contact: [employer]  [employer] calling to confirm pt confinement for date of service 3/4/17-3/5/17 can be reached at [phone]  
Dates confirmed  
No

## 2024-11-19 NOTE — H&P PST ADULT - NSICDXPASTMEDICALHX_GEN_ALL_CORE_FT
PAST MEDICAL HISTORY:  Localized swelling, mass and lump, left upper limb     Sinus infection current - on Z-pack, symptoms resolving

## 2024-11-19 NOTE — H&P PST ADULT - ATTENDING COMMENTS
49-year-old lady with a slowly enlarging, symptomatic, solid soft tissue mass of the left axilla (~3 cm).    She is scheduled for excision, with plastics closure (Dr. Devan Rangel).    Oncologic concerns, operative approach, risk, benefits, alternatives, possible surgical outcomes reviewed with her in my office, and again today.    All questions answered, consent on chart

## 2024-11-20 PROBLEM — R22.32 LOCALIZED SWELLING, MASS AND LUMP, LEFT UPPER LIMB: Chronic | Status: ACTIVE | Noted: 2024-11-19

## 2024-11-21 RX ORDER — AMMONIUM LACTATE/UREA 10 %-20 %
0 CREAM (GRAM) TOPICAL
Refills: 0 | DISCHARGE

## 2024-11-21 NOTE — ASU PATIENT PROFILE, ADULT - NSICDXPASTMEDICALHX_GEN_ALL_CORE_FT
PAST MEDICAL HISTORY:  Localized swelling, mass and lump, left upper limb     Sinus infection current - on Z-pack, symptoms resolving     PAST MEDICAL HISTORY:  Localized swelling, mass and lump, left upper limb

## 2024-11-21 NOTE — ASU PATIENT PROFILE, ADULT - PRO MENTAL HEALTH SX RECENT
Pt awoke this am with generalized itchy rash, dad denies new foods or products thinking it is a stuffed animal he slept with, pt also fell at recess hitting head on pavement, no loc or n/v, reddened area noted to back scalp
none

## 2024-11-21 NOTE — ASU PATIENT PROFILE, ADULT - NSICDXPASTSURGICALHX_GEN_ALL_CORE_FT
PAST SURGICAL HISTORY:  Encounter for insertion of Mirena IUD     H/O abdominoplasty     H/O  section 2011    S/P D&C

## 2024-11-22 ENCOUNTER — APPOINTMENT (OUTPATIENT)
Dept: PLASTIC SURGERY | Facility: HOSPITAL | Age: 49
End: 2024-11-22

## 2024-11-22 ENCOUNTER — OUTPATIENT (OUTPATIENT)
Dept: OUTPATIENT SERVICES | Facility: HOSPITAL | Age: 49
LOS: 1 days | End: 2024-11-22
Payer: COMMERCIAL

## 2024-11-22 ENCOUNTER — TRANSCRIPTION ENCOUNTER (OUTPATIENT)
Age: 49
End: 2024-11-22

## 2024-11-22 ENCOUNTER — APPOINTMENT (OUTPATIENT)
Dept: SURGICAL ONCOLOGY | Facility: HOSPITAL | Age: 49
End: 2024-11-22

## 2024-11-22 VITALS
DIASTOLIC BLOOD PRESSURE: 58 MMHG | OXYGEN SATURATION: 100 % | TEMPERATURE: 98 F | RESPIRATION RATE: 16 BRPM | SYSTOLIC BLOOD PRESSURE: 93 MMHG | HEART RATE: 61 BPM

## 2024-11-22 VITALS
WEIGHT: 91.71 LBS | HEIGHT: 57.25 IN | TEMPERATURE: 99 F | RESPIRATION RATE: 14 BRPM | SYSTOLIC BLOOD PRESSURE: 100 MMHG | OXYGEN SATURATION: 100 % | HEART RATE: 71 BPM | DIASTOLIC BLOOD PRESSURE: 64 MMHG

## 2024-11-22 DIAGNOSIS — Z30.430 ENCOUNTER FOR INSERTION OF INTRAUTERINE CONTRACEPTIVE DEVICE: Chronic | ICD-10-CM

## 2024-11-22 DIAGNOSIS — R22.32 LOCALIZED SWELLING, MASS AND LUMP, LEFT UPPER LIMB: ICD-10-CM

## 2024-11-22 DIAGNOSIS — Z98.890 OTHER SPECIFIED POSTPROCEDURAL STATES: Chronic | ICD-10-CM

## 2024-11-22 PROCEDURE — 19120 REMOVAL OF BREAST LESION: CPT

## 2024-11-22 PROCEDURE — 14301 TIS TRNFR ANY 30.1-60 SQ CM: CPT

## 2024-11-22 PROCEDURE — 88305 TISSUE EXAM BY PATHOLOGIST: CPT

## 2024-11-22 PROCEDURE — 88305 TISSUE EXAM BY PATHOLOGIST: CPT | Mod: 26

## 2024-11-22 PROCEDURE — 21555 EXC NECK LES SC < 3 CM: CPT

## 2024-11-22 PROCEDURE — C1889: CPT

## 2024-11-22 DEVICE — ARISTA 3GR: Type: IMPLANTABLE DEVICE | Site: LEFT | Status: FUNCTIONAL

## 2024-11-22 RX ORDER — AMMONIUM LACTATE/UREA 10 %-20 %
0 CREAM (GRAM) TOPICAL
Refills: 0 | DISCHARGE

## 2024-11-22 RX ORDER — 0.9 % SODIUM CHLORIDE 0.9 %
1000 INTRAVENOUS SOLUTION INTRAVENOUS
Refills: 0 | Status: ACTIVE | OUTPATIENT
Start: 2024-11-22 | End: 2025-10-21

## 2024-11-22 RX ORDER — ECONAZOLE NITRATE 10 MG/G
1 CREAM TOPICAL
Refills: 0 | DISCHARGE

## 2024-11-22 RX ORDER — HYDROMORPHONE HYDROCHLORIDE 2 MG/1
0.5 TABLET ORAL
Refills: 0 | Status: DISCONTINUED | OUTPATIENT
Start: 2024-11-22 | End: 2024-11-22

## 2024-11-22 RX ORDER — HEPARIN SODIUM,PORCINE 1000/ML
5000 VIAL (ML) INJECTION ONCE
Refills: 0 | Status: COMPLETED | OUTPATIENT
Start: 2024-11-22 | End: 2024-11-22

## 2024-11-22 RX ORDER — ONDANSETRON HYDROCHLORIDE 4 MG/1
4 TABLET, FILM COATED ORAL ONCE
Refills: 0 | Status: DISCONTINUED | OUTPATIENT
Start: 2024-11-22 | End: 2024-11-22

## 2024-11-22 RX ORDER — HYDROMORPHONE HYDROCHLORIDE 2 MG/1
1 TABLET ORAL
Refills: 0 | Status: DISCONTINUED | OUTPATIENT
Start: 2024-11-22 | End: 2024-11-22

## 2024-11-22 RX ORDER — OXYCODONE HYDROCHLORIDE 30 MG/1
1 TABLET ORAL
Qty: 12 | Refills: 0
Start: 2024-11-22 | End: 2024-11-24

## 2024-11-22 RX ORDER — CEFADROXIL 250 MG/5ML
1 SUSPENSION, RECONSTITUTED, ORAL (ML) ORAL
Qty: 14 | Refills: 0
Start: 2024-11-22 | End: 2024-11-28

## 2024-11-22 RX ORDER — 0.9 % SODIUM CHLORIDE 0.9 %
1000 INTRAVENOUS SOLUTION INTRAVENOUS
Refills: 0 | Status: DISCONTINUED | OUTPATIENT
Start: 2024-11-22 | End: 2024-11-22

## 2024-11-22 RX ADMIN — Medication 5000 UNIT(S): at 10:27

## 2024-11-22 RX ADMIN — Medication 50 MILLILITER(S): at 10:08

## 2024-11-22 NOTE — BRIEF OPERATIVE NOTE - NSICDXBRIEFPREOP_GEN_ALL_CORE_FT
PRE-OP DIAGNOSIS:  Mass of axilla, left 22-Nov-2024 13:38:00  Agustin Prasad  
PRE-OP DIAGNOSIS:  Mass of axilla, left 22-Nov-2024 13:38:00  Agustin Prasad

## 2024-11-22 NOTE — PRE-ANESTHESIA EVALUATION ADULT - RESPIRATORY RATE (BREATHS/MIN)
Detail Level: Detailed
Quality 130: Documentation Of Current Medications In The Medical Record: Current Medications Documented
Quality 402: Tobacco Use And Help With Quitting Among Adolescents: Patient screened for tobacco and never smoked
Quality 431: Preventive Care And Screening: Unhealthy Alcohol Use - Screening: Patient identified as an unhealthy alcohol user when screened for unhealthy alcohol use using a systematic screening method and received brief counseling
14

## 2024-11-22 NOTE — ASU DISCHARGE PLAN (ADULT/PEDIATRIC) - ASU DC SPECIAL INSTRUCTIONSFT
Initial followup with plastic surgery in the next 5-15 days, regarding matters of bandages, activities, and showering.    Dr. Braun should call with pathology report in approximately 2 weeks.  The conversation will determine further management. - Resume normal diet. Avoid straining, exercise, heavy lifting, pushing, pulling, or direct pressure to the incision site. You can otherwise return to your normal daily activities.   - You can take tylenol over the counter for pain. You have also been prescribed oxycodone, a narcotic, please do not exceed the maximum daily dose. Do not drive if taking narcotic pain medication. Please take stool softeners to avoid constipation.   - You may shower/rinse with warm & soapy water 24-48 hours after surgery. Please pat dry, do NOT scrub or rub the area. Do not take baths or submerge in any bodies of water. If you cannot shower and keep this area dry, you may sponge bath instead. Otherwise, you can get your other incisions wet. Please do not scrub your incision site/dressing, but you can let water run over the bandage.   - Call 911 and return to the ED for chest pain, shortness of breath, significant increase in pain, or significant change in color of surgical sites.    Please follow up with Dr. Rangel within 1 week after discharge from the hospital. You may call [phone] to schedule an appointment if you do not have one already.    Dr. Braun should call with pathology report in approximately 2 weeks.  The conversation will determine further management. - Resume normal diet. Avoid straining, exercise, heavy lifting, pushing, pulling, or direct pressure to the incision site. You can otherwise return to your normal daily activities.   - You can take tylenol over the counter for pain. You have also been prescribed oxycodone, a narcotic, please do not exceed the maximum daily dose. Do not drive if taking narcotic pain medication. Please take stool softeners to avoid constipation.   - You have also been prescribed Duricef, an antibiotic, which you should take as directed.  - You may shower/rinse with warm & soapy water 24-48 hours after surgery. Please pat dry, do NOT scrub or rub the area. Do not take baths or submerge in any bodies of water. If you cannot shower and keep this area dry, you may sponge bath instead.   - Do not remove steri strips. They will fall off on their own. After showering, please pat steri strips dry. After surgery, some blood may escape from under the tape. It is of no consequence. The paper tape may fall off after several days. If not, I will remove it in the office on your follow-up visit after surgery.  - Call 911 and return to the ED for chest pain, shortness of breath, significant increase in pain, or significant change in color of surgical sites.    Please follow up with Dr. Rangel within 1 week after discharge from the hospital. You may call [phone] to schedule an appointment if you do not have one already.    Dr. Braun should call with pathology report in approximately 2 weeks.  The conversation will determine further management.

## 2024-11-22 NOTE — BRIEF OPERATIVE NOTE - NSICDXBRIEFPOSTOP_GEN_ALL_CORE_FT
POST-OP DIAGNOSIS:  Mass of axilla, left 22-Nov-2024 13:38:22  Agustin Prasad  
POST-OP DIAGNOSIS:  Mass of axilla, left 22-Nov-2024 13:38:22  Agustin Prasad

## 2024-11-22 NOTE — ASU DISCHARGE PLAN (ADULT/PEDIATRIC) - FINANCIAL ASSISTANCE
[facility] provides services at a reduced cost to those who are determined to be eligible through [facility]’s financial assistance program. Information regarding [facility]’s financial assistance program can be found by going to [email or web address] or by calling [phone].

## 2024-11-22 NOTE — PRE-ANESTHESIA EVALUATION ADULT - NSANTHDISPORD_GEN_ALL_CORE
Dr. Angela prescribed Prednisone prior to surgery.  This was discussed with the patient at his office visit on May 24, 2018.  At this time, prescription was sent to the pharmacy and letter was sent to the patient informing him prescription was sent.   
PACU

## 2024-11-22 NOTE — BRIEF OPERATIVE NOTE - NSICDXBRIEFPROCEDURE_GEN_ALL_CORE_FT
PROCEDURES:  Complex repair, wound, face, axilla, genitalia, hand, or foot, 2.6 cm to 7.5 cm 22-Nov-2024 13:47:00  Chidi Rangel  
PROCEDURES:  Biopsy, axillary mass 22-Nov-2024 13:37:42 Left axilla mass excision Agustin Prasad

## 2024-11-22 NOTE — ASU DISCHARGE PLAN (ADULT/PEDIATRIC) - CARE PROVIDER_API CALL
Devan Rangel)  Plastic Surgery  [address] [place] [address], [address]  [place] [address]  Phone: [phone]  Fax: [phone]  Established Patient  Follow Up Time: 1 week

## 2024-11-25 PROBLEM — Z98.890 POSTOPERATIVE STATE: Status: ACTIVE | Noted: 2024-11-25

## 2024-11-26 ENCOUNTER — APPOINTMENT (OUTPATIENT)
Dept: PLASTIC SURGERY | Facility: CLINIC | Age: 49
End: 2024-11-26
Payer: COMMERCIAL

## 2024-11-26 ENCOUNTER — NON-APPOINTMENT (OUTPATIENT)
Age: 49
End: 2024-11-26

## 2024-11-26 VITALS
BODY MASS INDEX: 18.89 KG/M2 | TEMPERATURE: 98 F | HEART RATE: 59 BPM | DIASTOLIC BLOOD PRESSURE: 58 MMHG | OXYGEN SATURATION: 99 % | WEIGHT: 90 LBS | HEIGHT: 58 IN | SYSTOLIC BLOOD PRESSURE: 94 MMHG

## 2024-11-26 DIAGNOSIS — Z98.890 OTHER SPECIFIED POSTPROCEDURAL STATES: ICD-10-CM

## 2024-11-26 PROCEDURE — 99024 POSTOP FOLLOW-UP VISIT: CPT

## 2024-12-03 LAB — SURGICAL PATHOLOGY STUDY: SIGNIFICANT CHANGE UP

## 2024-12-04 PROBLEM — R22.32 LOCALIZED SWELLING, MASS AND LUMP, LEFT UPPER LIMB: Chronic | Status: ACTIVE | Noted: 2024-11-22

## 2024-12-12 ENCOUNTER — APPOINTMENT (OUTPATIENT)
Dept: PLASTIC SURGERY | Facility: CLINIC | Age: 49
End: 2024-12-12
Payer: COMMERCIAL

## 2024-12-12 VITALS
DIASTOLIC BLOOD PRESSURE: 64 MMHG | BODY MASS INDEX: 18.89 KG/M2 | TEMPERATURE: 98 F | HEART RATE: 54 BPM | SYSTOLIC BLOOD PRESSURE: 99 MMHG | HEIGHT: 58 IN | OXYGEN SATURATION: 99 % | WEIGHT: 90 LBS

## 2024-12-12 DIAGNOSIS — R22.32 LOCALIZED SWELLING, MASS AND LUMP, LEFT UPPER LIMB: ICD-10-CM

## 2024-12-12 DIAGNOSIS — Z98.890 OTHER SPECIFIED POSTPROCEDURAL STATES: ICD-10-CM

## 2024-12-12 PROCEDURE — 99024 POSTOP FOLLOW-UP VISIT: CPT

## 2025-01-14 ENCOUNTER — APPOINTMENT (OUTPATIENT)
Dept: PLASTIC SURGERY | Facility: CLINIC | Age: 50
End: 2025-01-14
Payer: COMMERCIAL

## 2025-01-14 VITALS
DIASTOLIC BLOOD PRESSURE: 54 MMHG | HEART RATE: 57 BPM | OXYGEN SATURATION: 99 % | BODY MASS INDEX: 18.75 KG/M2 | SYSTOLIC BLOOD PRESSURE: 93 MMHG | HEIGHT: 59 IN | TEMPERATURE: 98.1 F | WEIGHT: 93 LBS

## 2025-01-14 DIAGNOSIS — R22.32 LOCALIZED SWELLING, MASS AND LUMP, LEFT UPPER LIMB: ICD-10-CM

## 2025-01-14 DIAGNOSIS — Z98.890 OTHER SPECIFIED POSTPROCEDURAL STATES: ICD-10-CM

## 2025-01-14 PROCEDURE — 99024 POSTOP FOLLOW-UP VISIT: CPT

## 2025-04-07 ENCOUNTER — NON-APPOINTMENT (OUTPATIENT)
Age: 50
End: 2025-04-07

## 2025-07-30 NOTE — PRE-ANESTHESIA EVALUATION ADULT - NSANTHTOTALSCORECAL_ENT_A_CORE
CARDIAC TRANSPLANT NOTE      Chief Complaint: Presents for surveillance clinic visit / 2Decho and labs. Completed Chemotherapy for small cell carcinoma on 07/24/25.    Basic Information  Patient is 65 year old gentleman s/p HVAD implant 9/01/16 secondary to ICM with multiple stents. Other PMH include DM2, HTN, hyperlipidemia, Gilbert's dx, and LV thrombus. He was listed for OHT on 8/31/16 then upgraded to status 1A for MSSA bacteremia/ MCS infection. Patient was treated with Cefazolin until time of transplant. He underwent OHT on 08/05/17. Post transplant complications included significant hypotension sec to mechanical cause s/p 3min of CPR, atrial fibrillation with RVR, (+) Serratia marcescens in the lungs and blood, small focal intraparenchymal hemorrhage in R frontal lobe with no residual. He was deemed stable for home discharge on 8/25/17.  Patient was noted to have reduced cardiac function since 2017 post transplant. He had an extensive cardiac work-up which has not shown any rejection, however multiple infarcts are confirmed by abnormal Cardiac MRI and Lexiscan. [facility] with IVUS by Dr. Cheatham 12/20/17 showed minor luminal irregularities in the LAD and in the distal circ (CAV1 per Dr. Blanco). He was initiated on Sirolimus 09/11/19 and discontinued MMF 09/23/19. Patient maintained on dual therapy with Tac and Sirolimus until 11/23/20 then MMF restarted due to worsening urine PC ratio gradually increased to >1, Sirolimus dc'd 11/30/20. Patient had a repeat Cardiac MRI on 7/12/21 showed LVEF 39.4%, RVEF 42.2%, and extensive mid-myocardial and subepicardial fibrosis. When compared to the prior cardiac MRI, the extensive scar is more defined and noted to primarily involve the mid-myocardium. There is no visual evidence of edema on T2/STIR imaging however with such extensive fibrosis/scar, there is a concern for a chronic rejection pathology. He had Rb PET scan at [facility] completed on 9/16/21, which showed 1) normal  coronaries 2) dense scar to inferior lateral wall (chronic nature) 3) no acute myocarditis or rejection 4) non-ischemic with extensive scar 5) 39% EF (40% on Cardiac MRI) 6) >2 coronary artery reserve is normal (patient =4), appropriately vasodilated with tissues getting normal blood flow 7) rest defect, although if it was a myocardial infarct then she would expect a low blood flow. Patient maintained on GDMT for HFrEF management and followed closely by our team.      Admission Hx   3/26/25-3/28/25: Admitted for evaluation of the large left sided mediastinal mass and LAD noted on the CTA brain/carotids and CT chest (03/25/25). He had add'l imaging which included MRI brain/CT CAP w contrast/CT neck soft tissue w contrast (03/26/25). He underwent IR lymph node biopsy of the right supraclavicular (03/28/25). Pending result of the biopsy, he was discharged to home per patient's request.    04/03/25-04/06/25: Admitted for chemotherapy initiation. Endorsed numbness in the jaw and voice hoarseness which is likely due to metastatic tumor compromising the nerve structures at the site per Heme/Onc. RUE PICC inserted on 04/03/25 and day 1 of chemotherapy started on 04/04/25 with Carboplatin / Etoposide. He remained hemodynamically stable and was ultimately discharged to home on 04/06/25 to continue his chemotherapy as outpatient.     Patient was last seen during his most recent hospital admission on 04/03/25 - 04/06/25. Patient presents to clinic today for surveillance clinic visit /2Decho and labs. He completed his 6th and final chemotherapy cycle on Thursday 07/24/25 with Dr. Holloway's office. His last CT C/A/P was completed on 06/27/25 which was done after 3 rounds of chemotherapy, and he states he will be scheduled for a repeat CT C/A/P for August 19th for surveillance and to determine next steps. Patient continues to endorse slight chin numbness. He states his voice hoarseness is improved, however not 100% resolved. He  states after receiving chemo he has slight nausea which resolves with Zofran and constipation. He has occasional fatigue and SOB, however not inhibiting ADLs. Denies fever, chills or weakness. Denies Orthopnea, CP or palpitations. Denies vomiting or diarrhea. Denies abdominal pain or urinary symptoms. No new lesions or rashes. No sick contacts or recent travels.    Past Med History  Pre Transplant  ICM with multiple stents  DM II  HTN  Hyperlipidemia  Gilbert's diagnosis  LV thrombus  Internal hemorrhoids 06/29/16     Post Transplant Complications  [facility]  Atrial fibrillation with RVR (8/10/17 I 8/12/17)  (+) Serratia marcescens in blood (8/09/17)/miniBAL (8/10/17)  (+) Staph aureus on LVAD wound pocket (8/05/17)  Small focal intraparenchymal hemorrhage in R frontal lobe (CT 8/09/17), no residual  Acute resp failure sec to surgery   Post op ATN   Steroid induced hyperglycemia  Insomnia  Right retroperitoneal hematoma post LHC (CT 6/21/22)  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 Bishop via radial approach   06/15/22 CAV 1, distal tapering of LAD (unchanged)  06/03/21 CAV 1  08/17/20 CAV 1, LAD 40%, Diag 20%  07/18/19 CAV 1, LAD - mild stenosis to mid LAD 40%, Diag proximal ~20% stenosis  12/20/17 CAV 1   12/19/18 CAV 1, LAD - Mid 20%, Diag 20%     Stress Test as of 9/16/21 09/16/21 at [facility] -  PET scan revealed 1) normal coronaries 2) dense scar to inferior lateral wall (chronic nature) 3) no acute myocarditis or rejection,  4)  non-ischemic with extensive scar 5) 39% EF (40% on Cardiac MRI) 6) >2 coronary artery reserve is normal (patient =4); appropriately vasodilated with tissues getting normal blood flow 7) rest defect, although if it was an myocardial infarct then she would expect a low blood flow  07/20/21 CPX showed VO2 max of 22.2 ml/kg/min, which represents 56% of his age-predicted maximum  12/15/18 Lexiscan (+) ischemia     Procedure History  08/05/17 - OHT  09/01/16 - Mediastinal exploration   09/01/16 - HVAD LVAD placement  06/29/16 - Colonoscopy revealed internal hemorrhoids  12/2009 - LAD stent  09/2002 - RCA stent     Family History  Ischemic heart disease -- BROTHER     Social History   Reports that he has quit smoking. His smoking use included cigarettes. He has never used smokeless tobacco. He reports previous alcohol use. He reports that he does not use drugs.    Family History   Problem Relation Age of Onset    Patient is unaware of any medical problems Mother     Hyperlipidemia Father        Review of Systems   Constitutional:  Positive for weight loss.        Occasional fatigue, not inhibiting ADLs    HENT: Negative.     Eyes: Negative.    Respiratory:          Endorses occasional SOB / Fatigue, however not inhibiting ADLs. Denies orthopnea, denies SOB at rest    Gastrointestinal:  Negative for abdominal pain, blood in stool, diarrhea, melena and vomiting.        Nausea and constipation after chemotherapy treatments, resolves with Zofran   Genitourinary: Negative.    Musculoskeletal:         Occasional lower back pain, unchanged from previous    Skin: Negative.    Neurological:         Right chin numbness, improved from prior    Psychiatric/Behavioral: Negative.          HEALTH STATUS  ALLERGIES:  No Known Allergies    Medications  Home Medications    Medication Directions Start Date End Date   TACROlimus (PROGRAF) 0.5 MG capsule Take 0.5 mg by mouth every evening. TAKE WITH THREE 1MG=3.5MG;7PM - PREVENT REJECTION      ondansetron (ZOFRAN) 4 MG tablet Take 2 tablets by mouth every 8 hours as needed for Nausea. 4/6/25    docusate sodium (COLACE) 100 MG capsule Take 1 capsule by mouth 2 times daily as needed for Constipation. 4/5/25    TACROlimus (PROGRAF) 1 MG capsule Take 3 mg by mouth in the morning and 3 mg in the evening. 7AM, 7PM - ANTI REJECTION 4/5/25    furosemide (LASIX) 20 MG tablet Take 2 tablets by mouth daily. 9AM - DIURETIC/WATER PILL 3/28/25 3/28/26   magnesium oxide (MAG-OX) 400 MG tablet Take 1 tablet by mouth daily. 9AM - SUPPLEMENT 3/28/25    gabapentin (NEURONTIN) 300 MG capsule Take 1 capsule by mouth every 6 hours as needed (Neuropathic pain). 3/28/25 4/27/25   HYDROcodone-acetaminophen (NORCO) 5-325 MG per tablet Take 1 tablet by mouth every 6 hours as needed for Pain. 3/28/25    ezetimibe (ZETIA) 10 MG tablet Take 1 tablet by mouth every evening. 9PM - DECREASES BAD CHOLESTEROL 1/9/25 1/9/26   sacubitril-valsartan (Entresto) 24-26 MG per tablet Take 1 tablet by mouth in the morning and 1 tablet in the evening. 9AM, 9PM - FLUID STATUS/ GRAFT FUNCTION **DOSE DECREASE 10/15/24    aspirin 81 MG chewable tablet Chew 1 tablet by mouth daily. 9AM- BLOOD THINNER 7/15/24    cholecalciferol 25 mcg(1,000 units) tablet Take 2 tablets by mouth daily. 9AM - BONE HEALTH 7/15/24    dapagliflozin (Farxiga) 10 MG tablet Take 1 tablet by mouth daily. 9AM - HELPS WITH FLUID VOLUME STATUS AND CONTROLS BLOOD SUGARS 7/15/24    famotidine (PEPCID) 20 MG tablet Take 1 tablet by mouth daily. 9AM - REDUCES GASTRIC ACID 7/15/24    fluticasone (FLONASE) 50 MCG/ACT nasal spray Spray 2 sprays in each nostril daily as needed (ALLERGY SYMPTOMS). ALLERGY 7/15/24 1/4/28   metoPROLOL succinate (TOPROL-XL) 25 MG 24 hr tablet Take 1 tablet by mouth daily. 9PM - LOWERS HEART RATE and  BLOOD PRESSURE 7/15/24    Multiple Vitamin (Multi Vitamin Daily) Tab Take 1 tablet by mouth daily. 9AM - SUPPLEMENT 7/15/24    Omega-3 Fatty Acids (Fish Oil) 1000  MG capsule Take 2 capsules by mouth in the morning and 2 capsules in the evening. 9AM, 5PM - REDUCES TRIGLYCERIDES 7/15/24    rosuvastatin (CRESTOR) 40 MG tablet Take 1 tablet by mouth at bedtime. 9PM - LOWERS CHOLESTEROL 7/15/24 7/15/25   Alogliptin Benzoate 25 MG Tab Take 25 mg by mouth daily. 7AM - Blood glucose control 7/19/23    citalopram (CeleXA) 10 MG tablet Take 1 tablet by mouth daily (at noon). 12 NOON - MOOD 7/19/23    acetaminophen (TYLENOL) 325 MG tablet Take 650 mg by mouth every 12 hours as needed for Pain.     ALPRAZolam (XANAX) 0.5 MG tablet Take 0.5 mg by mouth 2 times daily as needed for Anxiety.  8/25/17         PHYSICAL EXAM  Visit Vitals  /69 (BP Location: RUE - Right upper extremity, Patient Position: Sitting, Cuff Size: Regular)   Pulse 96   Temp 97.5 °F (36.4 °C) (Oral)   Resp 18   Wt 70.1 kg (154 lb 8.7 oz)   SpO2 99%   BMI 22.17 kg/m²            Physical Exam  Constitutional:       Appearance: Normal appearance.   HENT:      Mouth/Throat:      Mouth: Mucous membranes are moist.      Pharynx: Oropharynx is clear.      Neck: Neck supple.   Neck:      Vascular: No JVD.   Cardiovascular:      Rate and Rhythm: Normal rate.      Pulses: Normal pulses.      Heart sounds: Normal heart sounds.   Pulmonary:      Effort: Pulmonary effort is normal.      Breath sounds: Examination of the left-upper field reveals wheezing. Examination of the left-middle field reveals wheezing. Wheezing present.      Comments: Slight VIOLETTA and LML inspiratory wheeze   Abdominal:      General: Abdomen is flat. Bowel sounds are normal.      Palpations: Abdomen is soft.   Musculoskeletal:         General: Normal range of motion.      Right lower leg: No edema.      Left lower leg: No edema.   Skin:     Capillary Refill: Capillary refill takes less than 2 seconds.   Neurological:      General: No focal deficit present.      Mental Status: He is alert and oriented to person, place, and time.   Psychiatric:         Mood  and Affect: Mood normal.         Behavior: Behavior is cooperative.         LABORATORY  Lab Services on 07/30/2025   Component Date Value Ref Range Status    Sodium 07/30/2025 137  135 - 145 mmol/L Final    Potassium 07/30/2025 3.8  3.4 - 5.1 mmol/L Final    Chloride 07/30/2025 103  97 - 110 mmol/L Final    Carbon Dioxide 07/30/2025 31  21 - 32 mmol/L Final    Anion Gap 07/30/2025 7  7 - 19 mmol/L Final    Glucose 07/30/2025 191 (H)  70 - 99 mg/dL Final    BUN 07/30/2025 31 (H)  6 - 20 mg/dL Final    Creatinine 07/30/2025 1.29 (H)  0.67 - 1.17 mg/dL Final    Glomerular Filtration Rate 07/30/2025 62  >=60 Final    eGFR results = or >60 mL/min/1.73m2 = Normal kidney function. Estimated GFR calculated using the CKD-EPI-R (2021) equation that does not include race in the creatinine calculation.    BUN/Cr 07/30/2025 24  7 - 25 Final    Calcium 07/30/2025 9.1  8.4 - 10.2 mg/dL Final    Bilirubin, Total 07/30/2025 0.5  0.2 - 1.0 mg/dL Final    GOT/AST 07/30/2025 15  <=37 Units/L Final    GPT/ALT 07/30/2025 19  <64 Units/L Final    Alkaline Phosphatase 07/30/2025 80  45 - 117 Units/L Final    Albumin 07/30/2025 3.5  3.4 - 5.0 g/dL Final    Protein, Total 07/30/2025 6.5  6.4 - 8.2 g/dL Final    Globulin 07/30/2025 3.0  2.0 - 4.0 g/dL Final    A/G Ratio 07/30/2025 1.2  1.0 - 2.4 Final    Magnesium 07/30/2025 1.9  1.7 - 2.4 mg/dL Final    TSH 07/30/2025 0.783  0.350 - 5.000 mcUnits/mL Final    LD, Total 07/30/2025 189  86 - 234 Units/L Final    WBC 07/30/2025 6.6  4.2 - 11.0 K/mcL Final    RBC 07/30/2025 3.28 (L)  4.50 - 5.90 mil/mcL Final    HGB 07/30/2025 10.8 (L)  13.0 - 17.0 g/dL Final    HCT 07/30/2025 32.5 (L)  39.0 - 51.0 % Final    MCV 07/30/2025 99.1  78.0 - 100.0 fl Final    MCH 07/30/2025 32.9  26.0 - 34.0 pg Final    MCHC 07/30/2025 33.2  32.0 - 36.5 g/dL Final    RDW-CV 07/30/2025 13.9  11.0 - 15.0 % Final    RDW-SD 07/30/2025 50.4 (H)  39.0 - 50.0 fL Final    PLT 07/30/2025 85 (L)  140 - 450 K/mcL Final    NRBC  2025 0  <=0 /100 WBC Final    CK 2025 30 (L)  39 - 308 Units/L Final    Phosphorus 2025 3.2  2.4 - 4.7 mg/dL Final    Cholesterol 2025 64  <=199 mg/dL Final      Desirable         <200  Borderline High   200 to 239  High              >=240    Triglycerides 2025 83  <=149 mg/dL Final      Normal            <150  Borderline High   150 to 199  High              200 to 499  Very High         >=500    HDL 2025 28 (L)  >=40 mg/dL Final      Low              <40  Borderline Low   40 to 49  Near Optimal     50 to 59  Optimal          >=60    LDL 2025 19  <=129 mg/dL Final      Optimal           <100  Near Optimal      100 to 129  Borderline High   130 to 159  High              160 to 189  Very High         >=190    Non-HDL Cholesterol 2025 36  mg/dL Final      Therapeutic Target:  CHD and risk equivalents  <130  Multiple risk factors     <160  0 to 1 risk factor        <190    Cholesterol/ HDL Ratio 2025 2.3  <=4.4 Final    Neutrophil, Percent 2025 60  % Final    Lymphocytes, Percent 2025 8  % Final    Mono, Percent 2025 8  % Final    Bands, Percent 2025 17 (H)  0 - 10 % Final    Metamyelocytes, Percent  2025 1  0 - 2 % Final    Reactive Lymphocytes, Percent 2025 6 (H)  0 - 5 % Final    Absolute Neutrophil 2025 5.1  1.8 - 7.7 K/mcL Final    Absolute Lymphocytes 2025 0.9 (L)  1.0 - 4.0 K/mcL Final    Absolute Monocytes 2025 0.5  0.3 - 0.9 K/mcL Final    Dohle Bodies 2025 Present   Final    Ovalocytes 2025 Few   Final    Platelet Morphology 2025 Normal  Normal Final         IMAGING & OTHER STUDIES      *[facility]*  [address] [address] [phone]  Transthoracic Echocardiogram (TTE)     Patient: Ronnell Martin     Study Date/Time:        2025 7:44AM  MRN:     [id number]             FIN#:                   [id number]  :     1960           Ht/Wt:                  177.8cm 73.9kg  Age:     65                  BSA/BMI:                1.91m^2 23.4kg/m^2  Gender:  M                   Baseline BP:            92 / 60  *Ordering Physician:* Ct Bowen     *Referring Physician:* Ct Bowen ; Ct Bowen     *Attending Physician:* Ct Bowen  *Performing Physician:* OP Heart Failure  *Diagnostic Physician:* Billy Jiménez MD  *Sonographer:* Ward Smith RDCS     ------------------------------------------------------------------------------  INDICATIONS:   S/p OHT.     ------------------------------------------------------------------------------  STUDY CONCLUSIONS  SUMMARY:     1. Left ventricle: The cavity size is normal. Wall thickness is normal.     Systolic function is mildly reduced. The average 2D speckle global     longitudinal strain is abnormal. The global longitudinal strain value is     -9.5%. The ejection fraction was measured by biplane method of disks. The     ejection fraction is 48%.  2. Right ventricle: The cavity size is normal. Systolic function is mildly     reduced.     ------------------------------------------------------------------------------  STUDY DATA:   Procedure:  A transthoracic echocardiogram was performed. Image  quality was adequate.  M-mode, complete 2D, complete spectral Doppler, color  Doppler, and strain imaging.  Study status:  Routine.  Study completion:  There were no complications.     FINDINGS     BASELINE ECG:   Normal sinus rhythm.  LEFT VENTRICLE:  The cavity size is normal. Wall thickness is normal. Systolic  function is mildly reduced. The average 2D speckle global longitudinal strain  is abnormal. The global longitudinal strain value is -9.5%. There is mild  diffuse hypokinesis.    The ejection fraction was measured by biplane method  of disks. The ejection fraction is 48%. The tissue Doppler parameters are  normal.     AORTIC VALVE:  The annulus is normal. The valve is  trileaflet. The leaflets  are normal thickness. Velocity is within the normal range. There is no  stenosis. There is no regurgitation.     AORTA:  Aortic root: The root is normal-sized.  Ascending aorta: The vessel is normal-sized.     MITRAL VALVE:  The annulus is normal. The leaflets are normal thickness.  Inflow velocity is within the normal range. There is no evidence for stenosis.  There is no regurgitation. The valve area by pressure half-time is 4.8cm^2.  The valve area index by pressure half-time is 2.5cm^2/m^2.     LEFT ATRIUM:  The atrium is normal in size and consistent with transplant.     RIGHT VENTRICLE:  The cavity size is normal. Systolic function is mildly  reduced. The estimated peak pressure is 20mm Hg.       The RV pressure during  systole is 20mm Hg.     PULMONIC VALVE:   The leaflets are normal thickness. Velocity is within the  normal range. There is no evidence for stenosis. There is no regurgitation.     TRICUSPID VALVE:  The leaflets are normal thickness. Inflow velocity is within  the normal range. There is no evidence for stenosis. There is trivial  regurgitation.     RIGHT ATRIUM:  The atrium is dilated and consistent with transplant.       The  estimated central venous pressure is 3mm Hg.     PERICARDIUM:   There is no pericardial effusion.     SYSTEMIC VEINS:  Inferior vena cava: The IVC is normal-sized.  Respirophasic diameter changes  are in the normal range (>= 50%).     ------------------------------------------------------------------------------  Measurements      Left ventricle            Value         Ref       04/03/2025  Right ventricle continued     Value          Ref       04/03/2025   GLS, 3P                   -9.50  %      --------- ----------  NURIS minor ax, A4C base    (H) 4.2   cm       2.5 - 4.1 ----------   NURIS, LAX chord        (N) 5.7    cm     4.2 - 5.8 5.1         Pressure, S                   20    mm Hg    --------- ----------   ESD, LAX chord        (H) 4.5     cm     2.5 - 4.0 4.2   NURIS/bsa, LAX chord    (N) 3.0    cm/m^2 2.2 - 3.0 2.6         Left atrium                   Value          Ref       04/03/2025   ESD/bsa, LAX chord    (H) 2.3    cm/m^2 1.3 - 2.1 2.2         AP dim, ES                (N) 3.9   cm       3.0 - 4.0 4.5   PW, ED, LAX           (N) 0.9    cm     0.6 - 1.0 1.4         AP dim index, ES          (N) 2.0   cm/m^2   1.5 - 2.3 2.3   NURIS major ax, A4C         6.5    cm     --------- 7.1         Area ES, A4C              (N) 16    cm^2     <=20      20   ESD major ax, A4C         5.3    cm     --------- 5.9         Area/bsa ES, A4C              8.36  cm^2/m^2 --------- 10.11   FS major axis, A4C        18     %      --------- 16          Area ES, A2C                  19    cm^2     --------- 24   NURIS/bsa major ax, A4C     3.4    cm/m^2 --------- 3.7         Area/bsa ES, A2C              9.93  cm^2/m^2 --------- 12.28   ESD/bsa major ax, A4C     2.8    cm/m^2 --------- 3.1         SI dim, A2C                   6.0   cm       --------- 6.9   LIBBY, A4C                  24.4   cm^2   --------- 21.2        Vol, S                    (N) 45    ml       18 - 58   56   OMAR, A4C                  14.7   cm^2   --------- 13.7        Vol/bsa, S                (N) 24    ml/m^2   16 - 34   29   FAC, A4C                  40     %      --------- 35          Vol, ES, 1-p A4C          (N) 37    ml       18 - 58   48   IVS, ED               (N) 0.9    cm     0.6 - 1.0 1.3         Vol/bsa, ES, 1-p A4C      (N) 19    ml/m^2   12 - 37   25   PW, ED                (N) 0.9    cm     0.6 - 1.0 1.4         Vol, ES, 1-p A2C          (N) 49    ml       18 - 58   66   IVS/PW, ED                1.07          --------- 0.9         Vol/bsa, ES, 1-p A2C      (N) 26    ml/m^2   11 - 43   34   EDV                   (H) 181    ml     62 - 150  130         Vol, ES, 2-p                  45    ml       --------- 56   ESV                   (H) 89     ml     21 - 61   76          Vol/bsa, ES, 2-p           (N) 23    ml/m^2   16 - 34   29   EF                    (L) 48     %      52 - 72   50   SV                        67     ml     --------- 42          Mitral valve                  Value          Ref       04/03/2025   EDV/bsa               (H) 95     ml/m^2 34 - 74   67          Peak E                        0.71  m/sec    --------- 0.75   ESV/bsa               (H) 46     ml/m^2 11 - 31   39          Peak A                        0.45  m/sec    --------- 0.41   SV/bsa                    35     ml/m^2 --------- 22          Decel time                    158   ms       --------- 134   SV, 1-p A4C               44     ml     --------- 26          PHT                           46    ms       --------- ----------   SV/bsa, 1-p A4C           23     ml/m^2 --------- 13          Peak E/A ratio                1.6            --------- 1.8   EDV, 2-p              (N) 78     ml     62 - 150  61          MVA, PHT                      4.8   cm^2     --------- ----------   ESV, 2-p              (N) 40     ml     21 - 61   31          MVA/bsa, PHT                  2.5   cm^2/m^2 --------- ----------   EF, 2-p               (L) 49     %      52 - 72   50   SV, 2-p                   38     ml     --------- 30          Tricuspid valve               Value          Ref       04/03/2025   EDV/bsa, 2-p          (N) 41     ml/m^2 34 - 74   32          TR peak v                 (N) 2     m/sec    <=2.8     ----------   ESV/bsa, 2-p          (N) 21     ml/m^2 11 - 31   16          Peak RV-RA grad, S            17    mm Hg    --------- ----------   SV/bsa, 2-p               19.7   ml/m^2 --------- 15.7   E', lat ivanna, TDI      (N) 15.6   cm/sec >=10.0    10.9        Ascending aorta               Value          Ref       04/03/2025   E/e', lat ivanna, TDI    (N) 5             <=13      7           AAo AP diam, ED           (N) 3.2   cm       2.2 - 3.8 ----------   E', med ivanna, TDI      (N) 7.1    cm/sec >=7.0     7.1         AAo AP  diam/bsa, ED       (N) 1.7   cm/m^2   1.1 - 1.9 ----------   E/e', med ivanna, TDI        10            --------- 11   E', avg, TDI              11.335 cm/sec --------- 8.985       Pulmonary artery              Value          Ref       04/03/2025   E/e', avg, TDI        (N) 6             <=14      8           Pressure, S                   20    mm Hg    --------- ----------      Right ventricle           Value         Ref       04/03/2025  Systemic veins                Value          Ref       04/03/2025   NURIS, LAX                  2.7    cm     --------- 2.5         Estimated CVP                 3     mm Hg    --------- ----------  Legend:  (L)  and  (H)  sandra values outside specified reference range.     (N)  marks values inside specified reference range.     Prepared and electronically signed by  Billy Jiménez MD  07/30/2025 09:59    IMPRESSION AND PLAN      Patient is a 65 year old male s/p OHT 08/05/17, new diagnosis for small cell carcinoma s/p chemotherapy treatment completion on 07/24/25:      Cardiovascular  ICM s/p HVAD 9/01/16 as BTT listed 1A for recurrent infection s/p OHT 8/05/17  Graft function  - TCIT 124 mins  - 2Decho (07/30/25) revealed LVEF 48%, stable from prior. Study reviewed by Dr. Jiménez  [facility] revealed LVEF 45-50%, no other significant issues. Study reviewed by Dr. Hayes  - [facility] (06/14/22) RA 6, PAP 29/13(20), PCWP 10, CO/CI (F) 4.7/2.4 (TD) 4.5/2.3, PA sat 70%  - Trend Cardiomems twice weekly  --> Goal PAD <= 24 (new as of 10/15/24). Euvolemic on exam, continue current management (07/30/25)   - HFrEF management:  ** Furosemide (Lasix) 40mg daily  ** Metoprolol XL 25 mg at bedtime (decreased due to fatigue 5/2018 ), hold for SBP < 100 mmHg. Patient states last dose was 3 weeks ago d/t SBP  at home (reported 07/30/25)   ** Farxiga 10 mg daily (08/29/21)  ** Entresto 24-26mg BID, hold for SBP <100mmHg  ** Off Spironolactone due to hyperkalemia 08/16/24  ** Off Lisinopril  12/09/20-07/28/22   - (03/02/18) CPX revealed peak VO2 19.5; 49% predicted. Abn pulm function WO pulm limitation to exercise. Mod to severe cardiac limitation to exercise. Dr. Russ aware  - (07/12/21) Cardiac MRI showed 1. LVEF = 39.4%, RVEF = 42.2%, 2. Extensive mid-myocardial and subepicardial fibrosis, 3. The scar suggests predominantly nonischemic pattern of scar. When compared to the prior cardiac MRI, the extensive scar is more defined and noted to primarily involve the mid-myocardium.  There is no visual evidence of edema on T2/STIR imaging; however, with such extensive fibrosis/scar, there is also a concern for a chronic rejection pathology. The areas of the hyperenhancement correspond to the myocardial segments with significant wall motion abnormalities. This pattern could represent a prior myocarditis or idiopathic dilated cardiomyopathy. [see report for cardiac MRI 1/23/18]  - (07/20/21) CPX (7/20/21) reached a VO2 max of 22.2 ml/kg/min, which represents 56% of his age-predicted maximum. The result is consistent with a cardiac limitation to exercise. There is evidence of inability to augment stroke volume with exercise  - (09/16/21) Rb PET scan at [facility] revealed 1) normal coronaries 2) dense scar to inferior lateral wall (chronic nature) 3) no acute myocarditis or rejection, 4) non-ischemic with extensive scar 5) 39% EF (40% on Cardiac MRI) 6) >2 coronary artery reserve is normal (patient =4); appropriately vasodilated with tissues getting normal blood flow 7) rest defect, although if it was an myocardial infect then she would expect a low blood flow -- Dr. Blanco/Dr. aHyes reviewed recent cardiac MRI with Dr. Lewis and reviewed cardiac PET with Dr. Lea  - Rpt cMRI DUE 3/2026 per Dr. Cordon  - With patient's current status/condition, stable LVEF/CAV 1, normal indices, asymptomatic, he does not meet criteria for heart transplant re-listing. Observe closely for need to re-list  - If LVEF <35% will  need to discuss need for AICD  C/Lexiscan  - [facility] access: Radial approach ONLY due to significant calcifications noted during procedure per Dr. Bishop (06/15/22)  - LHC/Allosure with Roverto per Dr. Cordon DUE 08/2025 (8th year, off protocol) . Per Dr. Jiménez: plan for [facility] September 2025 off protocol with Dr. Layne for surveillance due to possible radiation therapy for current lung CA diagnosis, Allosure due now (07/30/25)   - LHC (07/15/24) via R radial approach (10/15/24) CAV 0, LVEDP 3 per Dr. Layne  ** L coronary system, angiographically normal LM. LAD is large sized vessel wraps around the apex with mild luminal irregularities.  Very mild mid LAD myocardial bridge. LCX is medium to large sized vessel with mild luminal irregularities.   ** R coronary system, large dominant vessel with mild luminal irregularities.   - LHC via radial approach (07/19/23) CAV 0, mild distal tapering of LAD per Dr. Bishop  - [facility] (06/15/22) CAV 1, no discrete blockages but there is distal tapering of the LAD suggestive of CAV per Dr. Bishop. Had R retroperitoneal hematoma complication   - [facility] (06/03/21) CAV 1 LAD/ Circ/ RCA with minor luminal irregularities per Dr. Blanco  - [facility] (08/2020) LAD 40% mid to distal, bridging, stable, diag 20% per Dr. Martell (CAV1)  - [facility] (07/18/19) CAV 1, LAD - mild stenosis to mid LAD 40%, diag proximal ~20% stenosis per Dr. Blanco  - [facility] with IVUS (12/19/18) CAV 1, LAD-mid 20%, diag 20%  - [facility] with IVUS (12/20/17) CAV 1 per Dr. Francis Vargas (12/15/17) revealed showed large anterior and anterolateral infarct with mild to moderate carlos-infarct ischemia; moderate infarct inferolateral and inferior wall without carlos-infarct ischemia; and dilated LV cavity with global hypokinesis and dyskinetic septum. EF 32%  Immunosuppression  - PRA 0% 1/27/16, Prospective XM /Retro XM / DSA (-)  - Induced by SM and Basiliximab day 0. Thymoglobulin 75 mg (8/08/17)  - [place] ab/ECXM (11/02/17, 08/04/21, 09/07/21)  negative  - DSA negative as of 04/03/25. Rpt DSA (07/30/25), result pending  - ATP (3/27/25) 528  (04/03/25) 588  - Allosure (6/15/22) <0.12 I (03/27/25) 0.05  - Allomap (6/15/22) 26  - IS regimen:  ** Monotherapy Tacrolimus since 3/27/25, goal 7-8 due to an extensive graft history as of 01/25/23  ** Dc'd MMF 750mg Q12 in the setting of malignancy (3/27/25)  ** Dc'd Sirolimus (11/30/20) due to worsening proteinuria (urine PC ratio 1021 mg/gm 11/16/20)  ** Dc'd Prednisone (12/20/18)  ** Rec'd empiric SM 1gm (11/01/17), 500mg (11/02/17) due to reduced cardiac function  - EMB (8/17/20) Gr 1A/1R ACR / IF negative for C4d/C3  - EMB (6/15/22) Gr 0 ACR, IF (--)  Hyperlipidemia  - Lipids (07/15/24) Chol 124, HDL 36, LDL 54, Trig 170; CPM   - Lipids (07/30/25) Chol 64, HDL 28, LDL 19, Trig 83; CPM  - On Rosuvastatin 40 mg QHS (10/25/22, max dose) + Omega3 2G BID + Zetia 10mg daily  - Reinforced lifestyle/dietary modifications and regular exercise    Hematology  Large left sided mediastinal mass with VIOLETTA extension  Left supraclavicular lymph node mandibular mass   Status post IR lymph node biopsy of the left supraclavicular lymph node, (+) for malignancy with metastatic small cell carcinoma (3/27/25)  - Reported right lateral lip/mid jaw numbness and voice hoarseness sec to the neoplastic process  - (03/25/25) CTA brain/CTA carotids revealed 1) partially imaged suspicious multilobulated masslike soft tissue density in the left upper mediastinum/left upper lobe of the lung, which spans at least 7.4 x 4.6 cm in aggregate 2) mildly enlarged left supraclavicular lymph node  - (03/25/25) CT chest w con revealed 1) very large multilobulated/heterogeneously enhancing and likely partially centrally necrotic 9 x 6 x 12.5 cm left upper mediastinal mass, which partially extends into the left upper lobe, mass results in extensive left upper and to a lesser degree left lower lobe bronchovascular structure encasement as detailed 2) mildly  enlarged left supraclavicular and left hilar lymph nodes, which are likely metastatic; other borderline enlarged mediastinal lymph nodes could be metastatic or reactive 3) multiple noncalcified bilateral lung nodules, the largest of which measures up to 6 mm in the left lower lobe  - (03/25/25) MRI brain w/wo con revealed asymmetric marrow replacement of the right mandibular condyle with surrounding ill-defined soft tissue density and enhancement.  Given overall MR appearance and same date chest CT, the possibility of a right mandibular condyle osseous and soft tissue metastasis is raised  - (03/27/25) CT CAP w/ con revealed 1) new left superior hilar/left upper mediastinal soft tissue mass measuring 8.4 x 5.7 x 10.3 cm with arterial, venous, and bronchial encasement as described above 2) multiple new bilateral lung nodules measure up to 5 mm, these are concerning for metastatic disease in the setting of suspected malignancy although may be too small to evaluate further by PET/CT or tissue sampling 4) enlarged left lower cervical lymph node highly suspicious for barbara metastasis 5) no evidence of metastatic disease in the abdomen or pelvis  - (03/27/25) CT neck soft tissue w/ con 1) large, locally advanced, primary lung carcinoma tumor involving the left lung hilum and mediastinum measuring about 9 cm 2) metastatic lymphadenopathy in the mediastinum, left hilum, and left supraclavicular region 2) tumor encasement of the aortic arch, left subclavian artery, left pulmonary artery, and left bronchus 3) suspected tumor invasion of the esophagus 4) left paravertebral tumor extension without definite spinal invasion 5) small lytic lesions in the right mandibular condyle suspicious for osseous metastatic disease  - (03/27/25) LDH/CEA, normal / (04/03/25) LDH/Uric acid normal / (06/27/25) LDH normal / (07/30/25) LDH normal   - (03/27/25) SPEP with Immunofixation/IgG quant, essentially normal electrophoretic pattern  -  (03/28/25) IR lymph node biopsy of the left supraclavicular lymph node, (+) for malignancy with metastatic small cell carcinoma  Chemotherapy plan per Dr. Holloway (4/3/25):  ** Carboplatin on day 1 and Etoposide on Days 1 ,2, 3 in a 21-day cycle X 6 cycles (Initiated 4/4/25). First cycle of chemo to be completed on an inpatient basis. Subsequent therapy will be done outpatient. Patient completed 6th and final cycle on 07/24/25  ** Access: Port-A-Cath placed in IR (04/23/25)   ** Hepatitis B profile (4/3/25): Negative  ** Growth factor support will be ordered after completing chemotherapy given risk for neutropenic complications/pancytopenia.   ** In order to minimize complications, plan to add Trilaciclib to chemotherapy regimen if feasible. Per pharmD, no concern for transplant patients should this be initiated by Onc.  - (05/13/25) CT C/A/P w/ contrast: Interval improvement. Dominant left upper lobe mass extending to the mediastinum is smaller. Left pulmonary artery is no longer compressed. Pulmonary nodules which are smaller in size. No evidence of metastatic disease abdomen pelvis.   - (06/27/25) CT C/A/P w/ contrast : 1.) Slight decrease in size of the left upper lobe mass and left upper mediastinal lymph node. 2.) No new suspicious intrathoracic findings. 3.) No new suspicious intra-abdominal or pelvic findings   - Per patient, repeat imaging of CT C/A/P will be done with Dr. Holloway's office in August 2025 followed by appointment with Dr. Holloway (07/30/25). Requested LOV note from Dr. Holloway's office (07/30/25)  Hx Secondary polycythemia  - Asymptomatic. Trend cts. Now with down trending Hgb in setting of chemotherapy and recent cancer diagnosis   - Lab uribe (05/2024) included Jak2 w/ reflex/MPL/E12-15/Calreticulin, all negative  - CT CAP wo con (03/09/22), no evidence of malignancy such as renal cell CA that would be attributed to the polycythemia  - Advised for rpt sleep apnea as sleep apnea can cause hypoxia at  0 night and can cause sec polycythemia as a compensatory mechanism  - Above rec per Lucinda Stovall from last visit 05/20/24. RTC as instructed  Thromyocytopenia  - Platelets 85K (07/30/25), forwarded to Dr. Holloway  - Patient denies any s/s overt bleeding  - Advised to hold ASA at this time until Platelets > 100K  - Will repeat labs in 1 week     Endocrine  Steroid-induced hyperglycemia  - HgbA1c (03/19/25) 7.3, forwarded to patient's Endo/Dr. Muñiz  (07/30/25) A1C pending   - On Trulicity 0.75 mg / week per Endo Dr. Zayas. Per patient, improvement on home BS readings  Bone health  - Vit D level (7/15/24) 41.4  (07/30/25) pending  - On Cholecalciferol 2000 units daily  - Dexascan (08/07/19) showed bilateral femoral neck/hip osteopenia with increased fracture risk  - Dexascan (06/03/20) showed osteopenia  TFT (07/15/24) WNL  (07/30/25) pending   Appt scheduled with Dr. Muñiz 11/13/2025 @ 0920AM    Pulm  ?DALE / Smoking hx  - Episodes of waking up gasping for air and in setting of elevated Hgb. Denies symptoms at this time (07/30/25)   - (03/01/22) Sleep study (03/01/22), no DALE as reported by patient  - (05/14/24) PFT revealed no evidence of obstructive defect, mild decrease in lung volumes but normal diffusion capacity  - (07/11/24) Surveillance CT chest revealed scarring and fibrotic changes at the lung bases, mild volume loss of the left lung base with changes of rounded atelectasis   - Instructed to reschedule appt for home sleep study ASAP since 10/15/24  - Follow up with Pulm as outpatient once completed, providing phone number to schedule (07/30/25)   Tracheal narrowing noted on CT (03/2025)  - Pt may require silicone Y stent placement if pt becomes symptomatic  - Ordered PFTs to evaluate for flow and volume loop in case patient's symptoms progress; can be compared which would allow to decide if he would need stent placement   - Above rec per Dr Ventura from 3/28/25   - Instructed to schedule  PFTs (07/30/25)     Vascular  Chronic bilateral leg pain  - None this visit. States improvement with Gabapentin (07/30/25) .   - Pain/LE fatigue occurs while ambulating, resolves at rest. Denies symptoms at current (07/30/25)   - Normal CPK as of 07/15/24  - (06/15/22) Bilat arterial duplex revealed 1) R mid-distal posterior tibial artery occlusion 2) R SUSU and peroneal arteries are patent 3) LLE arteries patent with no hemodynamically significant stenosis 4) scattered calcification in the calf arteries with biphasic waveforms  ** Dr. Bishop made aware, likely incidental chronic finding and likely representative of chronic PAD as suspected. Rec: follow up with vascular surgery for further evaluation  - (10/19/22) CTA/ AAA pre-stent protocol revealed mod calcified and non calcified plaque affect non aneurysmal abdominal aorta and branches, with moderate calcification at its bifurcation into the iliacs. R common iliac artery widely patent with mild to mod atherosclerotic disease burden extending to CFA. L common iliac artery widely patent with mild atherosclerotic disease burden extending to CFA. LLL pleural-parenchymal scarring with rounded morphology and punctuate calcification, could be related to sequela of remote infection/ inflamm  - Above results reviewed by Dr. Rodriguez (10/24/22) calcified but patent vessels, no indication for vascular intervention  - Less likely a med SE per Dr. Martinez  - On PRN Gabapentin 300mg Q12hrs since 07/2024    Infectious Disease  Anti infectives  - CMV donor (--), recipient (+). Completed Valcyte 03/09/18  - Toxo donor (--), recipient (--). Completed Bactrim 08/06/18  - Fungal prophylaxis. Completed Clotrimazole 11/02/17  - CMV/EBV quant negative as of 3/19/25.     Renal  SOFIA on CKD  - Trend Crea  - Diuretic plan as above   - Anthony renal US (10/16/17) unremarkable  Sirolimus induced proteinuria (11/16/20), dc'd SRL 11/30/20    Neuro  Prev c/o Head pressure and (R) jaw pain, currently  denies (07/30/25)   - Per Dr. Holloway (4/3/25): This is secondary to neoplastic process which involves the (R) mandible  - Pain med. PRN  Small focal intraparenchymal hemorrhage R frontal lobe (CT 8/09/17) then complained of intermittent HA/mental fogginess (09/20/19)  - (09/14/19) MRI brain, no acute infarct  - (08/08/18) CT head wo contrast showed periventricular white matter hypoattenuation is favored to represent sequelae of chronic small vessel ischemic disease. Trace encephalomalacia within the right frontal centrum semiovale, likely representing sequela of prior hemorrhage. No acute intracranial process  - CTA head/neck to rule out aneurysm/ AV malformation once medically appropriate from Neuro standpoint. Dr. Arias to hold off     MSK  R hip/knee and low back pain -- resolved  - Denies complaints as of 07/30/25  - XR lumbar spine AP/lat (06/18/24) revealed moderate lumbar spine degenerative change most significant at L5-S1  - Seen by Dr. Bentley (06/18/24) chronic low back pain w/ bilateral sciatica. Recs: PT    ENT  \"Muffled\" hearing as of 9/10/19  - No complaints this visit (07/30/25)   - Seen by [facility] Audiology services on 4/09/18  - Hearing aids 1/2023     Routine Health Maintenance  Annual labs completed 07/30/25 (partial, will repeat EBV and CMV as these were not done)  Annual CT chest completed 06/27/25  Colonoscopy (08/31/22) minuscule polyp, removed from the sigmoid colon, otherwise normal colonoscopy  ** Biopsy showed colonic mucosa w/ prominent lymphoid aggregate  ** Screening colonoscopy DUE 08/2029  Dexascan completed 03/25/22. DUE NOW   Derm, Dr. Adames completed 08/2021. DUE NOW  Dental for extraction/implant completed 10/2024. Follows with dentist PRN for tightening of dental implants / ensure correct alignment   Ophtha completed 3/2024, cataracts/no surgical intervention. DUE NOW  Immunization  ** Flushot/COVID vaccines DUE NOW  PCP Dr. Enrrique Jones  [phone]    EDUCATION  Discharge instructions provided to patient re:  home medications/strict compliance, FU labs as scheduled, FU clinic visit/imaging as scheduled, FU with consultants as instructed and prompt reporting of symptoms such as shortness of breath, chest pain, palpitations;lower extremity swelling, weight gain >2lbs in 1 day or >5lbs in 1 week; fever, chills, weakness; nausea, vomiting, diarrhea/constipation, abdominal discomfort or urinary symptoms.     PROFESSIONAL SERVICES  Seen and discussed plan of care with Dr. Jiménez and Gage.

## (undated) DEVICE — DRSG TEGADERM 3.5 X 6"

## (undated) DEVICE — SPECIMEN CONTAINER 100ML

## (undated) DEVICE — SUT POLYSORB 3-0 30" V-20 UNDYED

## (undated) DEVICE — GAMMA SLEEVE DISPOSABLE

## (undated) DEVICE — SOL IRR POUR H2O 250ML

## (undated) DEVICE — BLADE SCALPEL SAFETYLOCK #15

## (undated) DEVICE — DRSG KLING 4"

## (undated) DEVICE — POSITIONER FOAM EGG CRATE ULNAR 2PCS (PINK)

## (undated) DEVICE — DRSG STERISTRIPS 0.5 X 4"

## (undated) DEVICE — DRAPE INSTRUMENT POUCH 6.75" X 11"

## (undated) DEVICE — SUT POLYSORB 0 36" GS-21 UNDYED

## (undated) DEVICE — VENODYNE/SCD SLEEVE CALF MEDIUM

## (undated) DEVICE — DRAPE TOWEL BLUE 17" X 24"

## (undated) DEVICE — WARMING BLANKET LOWER ADULT

## (undated) DEVICE — DRAPE THYROID 77" X 123"

## (undated) DEVICE — GLV 7 PROTEXIS (WHITE)

## (undated) DEVICE — GLV 7.5 PROTEXIS (WHITE)

## (undated) DEVICE — LIGASURE SMALL JAW

## (undated) DEVICE — FOLEY TRAY 16FR 5CC LTX UMETER CLOSED

## (undated) DEVICE — SOL IRR POUR NS 0.9% 500ML

## (undated) DEVICE — DRSG OPSITE 2.5 X 2"

## (undated) DEVICE — SUT MONOCRYL 4-0 27" PS-2 UNDYED

## (undated) DEVICE — SUT POLYSORB 3-0 18" P-12 UNDYED

## (undated) DEVICE — DRSG MASTISOL

## (undated) DEVICE — SUT POLYSORB 2-0 30" V-20 UNDYED

## (undated) DEVICE — SPONGE PEANUT AUTO COUNT

## (undated) DEVICE — Device

## (undated) DEVICE — SOL IRR POUR H2O 500ML

## (undated) DEVICE — MARKING PEN W RULER

## (undated) DEVICE — SUT POLYSORB 2-0 30" GS-21 UNDYED

## (undated) DEVICE — PACK MINOR

## (undated) DEVICE — DRSG CURITY GAUZE SPONGE 4 X 4" 12-PLY

## (undated) DEVICE — SUT SOFSILK 2-0 18" C-23

## (undated) DEVICE — GOWN TRIMAX LG

## (undated) DEVICE — DRSG TEGADERM 6 X 8"

## (undated) DEVICE — GLV 7.5 PROTEXIS (CREAM) NEU-THERA

## (undated) DEVICE — MEDICATION LABELS W MARKER

## (undated) DEVICE — VENODYNE/SCD SLEEVE CALF LARGE